# Patient Record
Sex: FEMALE | URBAN - METROPOLITAN AREA
[De-identification: names, ages, dates, MRNs, and addresses within clinical notes are randomized per-mention and may not be internally consistent; named-entity substitution may affect disease eponyms.]

---

## 2017-10-27 ENCOUNTER — OUTPATIENT (OUTPATIENT)
Dept: OUTPATIENT SERVICES | Facility: HOSPITAL | Age: 67
LOS: 1 days | End: 2017-10-27
Payer: MEDICARE

## 2017-10-27 PROCEDURE — 71020: CPT | Mod: 26

## 2017-11-03 ENCOUNTER — OUTPATIENT (OUTPATIENT)
Dept: OUTPATIENT SERVICES | Facility: HOSPITAL | Age: 67
LOS: 1 days | End: 2017-11-03

## 2022-08-31 PROBLEM — Z00.00 ENCOUNTER FOR PREVENTIVE HEALTH EXAMINATION: Status: ACTIVE | Noted: 2022-08-31

## 2022-09-01 ENCOUNTER — APPOINTMENT (OUTPATIENT)
Dept: UROLOGY | Facility: CLINIC | Age: 72
End: 2022-09-01

## 2022-09-01 VITALS
HEIGHT: 66 IN | HEART RATE: 137 BPM | WEIGHT: 107 LBS | OXYGEN SATURATION: 96 % | SYSTOLIC BLOOD PRESSURE: 113 MMHG | BODY MASS INDEX: 17.19 KG/M2 | DIASTOLIC BLOOD PRESSURE: 80 MMHG

## 2022-09-01 DIAGNOSIS — M54.9 DORSALGIA, UNSPECIFIED: ICD-10-CM

## 2022-09-01 PROCEDURE — 99204 OFFICE O/P NEW MOD 45 MIN: CPT

## 2022-09-01 RX ORDER — OXYCODONE AND ACETAMINOPHEN 5; 325 MG/1; MG/1
5-325 TABLET ORAL
Qty: 15 | Refills: 0 | Status: ACTIVE | COMMUNITY
Start: 2022-09-01 | End: 1900-01-01

## 2022-09-01 NOTE — REVIEW OF SYSTEMS
[Urine Infection (bladder/kidney)] : bladder/kidney infection [History of kidney stones] : history of kidney stones [Wake up at night to urinate  How many times?  ___] : wakes up to urinate [unfilled] times during the night [Leakage of urine with straining, coughing, laughing] : leakage of urine with straining, coughing, laughing [see HPI] : see HPI [Negative] : Heme/Lymph [FreeTextEntry2] : high blood pressure

## 2022-09-01 NOTE — PHYSICAL EXAM

## 2022-09-01 NOTE — HISTORY OF PRESENT ILLNESS
[FreeTextEntry1] : 71F presents today with a CC of a several month history of left flank and back pain. Pt states that she has a history of renal calculus formation in the past. She has had pain for the past several months and apparently AT-12 compression fractures have been noted. Her spine surgeon says that this is not the reason for her left flank and back pain. PMHx includes renal stone formation, GERD, high cholesterol, cardiovascular issues. PSHx includes subtotal thyroidectomy. Allergies are to penicillin. Medications includes Aspirin 81mg per day, Humira injection every two weeks, Crestor 5mg, Verapamil 100mg daily and Prilosec 20mg daily. Family history shows lung cancer and colon cancer in father. Pt uses tobacco one pack per day for many years. ETOH use is denied. Pt is a pharmacy technician.

## 2022-09-01 NOTE — END OF VISIT
[FreeTextEntry3] : I called in pain medications for her. I ordered a CT scan with and without contrast, a KUB x-ray, lumbosacral spine films, and thoracic spine films. Urine is sent for culture analysis and cytology. We will follow up regarding results.

## 2022-09-02 DIAGNOSIS — R80.9 PROTEINURIA, UNSPECIFIED: ICD-10-CM

## 2022-09-02 LAB
APPEARANCE: CLEAR
BACTERIA: NEGATIVE
BILIRUBIN URINE: ABNORMAL
BLOOD URINE: NEGATIVE
COLOR: YELLOW
GLUCOSE QUALITATIVE U: NORMAL
HYALINE CASTS: 0 /LPF
KETONES URINE: NORMAL
LEUKOCYTE ESTERASE URINE: NEGATIVE
MICROSCOPIC-UA: NORMAL
NITRITE URINE: NEGATIVE
PH URINE: 6
PROTEIN URINE: ABNORMAL
RED BLOOD CELLS URINE: 7 /HPF
SPECIFIC GRAVITY URINE: 1.03
SQUAMOUS EPITHELIAL CELLS: 7 /HPF
URINE COMMENTS: NORMAL
UROBILINOGEN URINE: ABNORMAL
WHITE BLOOD CELLS URINE: 4 /HPF

## 2022-09-03 PROBLEM — R80.9 ALBUMINURIA: Status: ACTIVE | Noted: 2022-09-03

## 2022-09-03 LAB — BACTERIA UR CULT: NORMAL

## 2022-09-12 ENCOUNTER — NON-APPOINTMENT (OUTPATIENT)
Age: 72
End: 2022-09-12

## 2022-09-12 DIAGNOSIS — N28.89 OTHER SPECIFIED DISORDERS OF KIDNEY AND URETER: ICD-10-CM

## 2022-09-12 DIAGNOSIS — R10.9 UNSPECIFIED ABDOMINAL PAIN: ICD-10-CM

## 2022-09-26 ENCOUNTER — OUTPATIENT (OUTPATIENT)
Dept: OUTPATIENT SERVICES | Facility: HOSPITAL | Age: 72
LOS: 1 days | End: 2022-09-26
Payer: MEDICARE

## 2022-09-26 DIAGNOSIS — N28.89 OTHER SPECIFIED DISORDERS OF KIDNEY AND URETER: ICD-10-CM

## 2022-09-26 PROCEDURE — 78708 K FLOW/FUNCT IMAGE W/DRUG: CPT | Mod: 26,MH

## 2022-09-26 PROCEDURE — A9562: CPT

## 2022-09-26 PROCEDURE — 78708 K FLOW/FUNCT IMAGE W/DRUG: CPT | Mod: MH

## 2022-09-26 RX ORDER — FUROSEMIDE 40 MG
40 TABLET ORAL ONCE
Refills: 0 | Status: COMPLETED | OUTPATIENT
Start: 2022-09-26 | End: 2022-09-26

## 2022-09-26 RX ORDER — SODIUM CHLORIDE 9 MG/ML
477 INJECTION INTRAMUSCULAR; INTRAVENOUS; SUBCUTANEOUS ONCE
Refills: 0 | Status: COMPLETED | OUTPATIENT
Start: 2022-09-26 | End: 2022-09-26

## 2022-09-26 RX ADMIN — Medication 40 MILLIGRAM(S): at 13:33

## 2022-09-26 RX ADMIN — SODIUM CHLORIDE 477 MILLILITER(S): 9 INJECTION INTRAMUSCULAR; INTRAVENOUS; SUBCUTANEOUS at 12:00

## 2022-09-27 DIAGNOSIS — N28.89 OTHER SPECIFIED DISORDERS OF KIDNEY AND URETER: ICD-10-CM

## 2022-09-30 ENCOUNTER — NON-APPOINTMENT (OUTPATIENT)
Age: 72
End: 2022-09-30

## 2022-11-29 RX ORDER — AZITHROMYCIN 500 MG/1
0 TABLET, FILM COATED ORAL
Qty: 0 | Refills: 0 | DISCHARGE
Start: 2022-11-29 | End: 2022-12-03

## 2022-12-01 ENCOUNTER — INPATIENT (INPATIENT)
Facility: HOSPITAL | Age: 72
LOS: 2 days | Discharge: HOME | DRG: 191 | End: 2022-12-04
Attending: HOSPITALIST | Admitting: INTERNAL MEDICINE
Payer: MEDICARE

## 2022-12-01 VITALS — WEIGHT: 102.07 LBS | HEIGHT: 66 IN

## 2022-12-01 DIAGNOSIS — J44.1 CHRONIC OBSTRUCTIVE PULMONARY DISEASE WITH (ACUTE) EXACERBATION: ICD-10-CM

## 2022-12-01 LAB
ALBUMIN SERPL ELPH-MCNC: 3.3 G/DL — SIGNIFICANT CHANGE UP (ref 3.3–5)
ALP SERPL-CCNC: 95 U/L — SIGNIFICANT CHANGE UP (ref 40–120)
ALT FLD-CCNC: 16 U/L — SIGNIFICANT CHANGE UP (ref 12–78)
ANION GAP SERPL CALC-SCNC: 10 MMOL/L — SIGNIFICANT CHANGE UP (ref 5–17)
APPEARANCE UR: CLEAR — SIGNIFICANT CHANGE UP
AST SERPL-CCNC: 11 U/L — LOW (ref 15–37)
BASOPHILS # BLD AUTO: 0.04 K/UL — SIGNIFICANT CHANGE UP (ref 0–0.2)
BASOPHILS NFR BLD AUTO: 0.2 % — SIGNIFICANT CHANGE UP (ref 0–2)
BILIRUB SERPL-MCNC: 0.3 MG/DL — SIGNIFICANT CHANGE UP (ref 0.2–1.2)
BILIRUB UR-MCNC: NEGATIVE — SIGNIFICANT CHANGE UP
BUN SERPL-MCNC: 13 MG/DL — SIGNIFICANT CHANGE UP (ref 7–23)
CALCIUM SERPL-MCNC: 10.3 MG/DL — HIGH (ref 8.5–10.1)
CHLORIDE SERPL-SCNC: 102 MMOL/L — SIGNIFICANT CHANGE UP (ref 96–108)
CO2 SERPL-SCNC: 25 MMOL/L — SIGNIFICANT CHANGE UP (ref 22–31)
COLOR SPEC: YELLOW — SIGNIFICANT CHANGE UP
CREAT SERPL-MCNC: 0.92 MG/DL — SIGNIFICANT CHANGE UP (ref 0.5–1.3)
DIFF PNL FLD: ABNORMAL
EGFR: 65 ML/MIN/1.73M2 — SIGNIFICANT CHANGE UP
EOSINOPHIL # BLD AUTO: 0 K/UL — SIGNIFICANT CHANGE UP (ref 0–0.5)
EOSINOPHIL NFR BLD AUTO: 0 % — SIGNIFICANT CHANGE UP (ref 0–6)
FLUAV AG NPH QL: SIGNIFICANT CHANGE UP
FLUBV AG NPH QL: SIGNIFICANT CHANGE UP
GLUCOSE SERPL-MCNC: 153 MG/DL — HIGH (ref 70–99)
GLUCOSE UR QL: NEGATIVE — SIGNIFICANT CHANGE UP
HCT VFR BLD CALC: 45.4 % — HIGH (ref 34.5–45)
HGB BLD-MCNC: 15.3 G/DL — SIGNIFICANT CHANGE UP (ref 11.5–15.5)
IMM GRANULOCYTES NFR BLD AUTO: 0.6 % — SIGNIFICANT CHANGE UP (ref 0–0.9)
KETONES UR-MCNC: NEGATIVE — SIGNIFICANT CHANGE UP
LEUKOCYTE ESTERASE UR-ACNC: NEGATIVE — SIGNIFICANT CHANGE UP
LYMPHOCYTES # BLD AUTO: 1.1 K/UL — SIGNIFICANT CHANGE UP (ref 1–3.3)
LYMPHOCYTES # BLD AUTO: 6.3 % — LOW (ref 13–44)
MAGNESIUM SERPL-MCNC: 1.8 MG/DL — SIGNIFICANT CHANGE UP (ref 1.6–2.6)
MCHC RBC-ENTMCNC: 29.3 PG — SIGNIFICANT CHANGE UP (ref 27–34)
MCHC RBC-ENTMCNC: 33.7 GM/DL — SIGNIFICANT CHANGE UP (ref 32–36)
MCV RBC AUTO: 86.8 FL — SIGNIFICANT CHANGE UP (ref 80–100)
MONOCYTES # BLD AUTO: 0.58 K/UL — SIGNIFICANT CHANGE UP (ref 0–0.9)
MONOCYTES NFR BLD AUTO: 3.3 % — SIGNIFICANT CHANGE UP (ref 2–14)
NEUTROPHILS # BLD AUTO: 15.73 K/UL — HIGH (ref 1.8–7.4)
NEUTROPHILS NFR BLD AUTO: 89.6 % — HIGH (ref 43–77)
NITRITE UR-MCNC: NEGATIVE — SIGNIFICANT CHANGE UP
NT-PROBNP SERPL-SCNC: 718 PG/ML — HIGH (ref 0–125)
PH UR: 7 — SIGNIFICANT CHANGE UP (ref 5–8)
PLATELET # BLD AUTO: 304 K/UL — SIGNIFICANT CHANGE UP (ref 150–400)
POTASSIUM SERPL-MCNC: 3.3 MMOL/L — LOW (ref 3.5–5.3)
POTASSIUM SERPL-SCNC: 3.3 MMOL/L — LOW (ref 3.5–5.3)
PROT SERPL-MCNC: 7.5 GM/DL — SIGNIFICANT CHANGE UP (ref 6–8.3)
PROT UR-MCNC: NEGATIVE — SIGNIFICANT CHANGE UP
RBC # BLD: 5.23 M/UL — HIGH (ref 3.8–5.2)
RBC # FLD: 13 % — SIGNIFICANT CHANGE UP (ref 10.3–14.5)
RSV RNA NPH QL NAA+NON-PROBE: SIGNIFICANT CHANGE UP
SARS-COV-2 RNA SPEC QL NAA+PROBE: SIGNIFICANT CHANGE UP
SODIUM SERPL-SCNC: 137 MMOL/L — SIGNIFICANT CHANGE UP (ref 135–145)
SP GR SPEC: 1 — LOW (ref 1.01–1.02)
TROPONIN I, HIGH SENSITIVITY RESULT: 6.9 NG/L — SIGNIFICANT CHANGE UP
UROBILINOGEN FLD QL: NEGATIVE — SIGNIFICANT CHANGE UP
WBC # BLD: 17.56 K/UL — HIGH (ref 3.8–10.5)
WBC # FLD AUTO: 17.56 K/UL — HIGH (ref 3.8–10.5)

## 2022-12-01 PROCEDURE — 93010 ELECTROCARDIOGRAM REPORT: CPT

## 2022-12-01 PROCEDURE — 81001 URINALYSIS AUTO W/SCOPE: CPT

## 2022-12-01 PROCEDURE — 84132 ASSAY OF SERUM POTASSIUM: CPT

## 2022-12-01 PROCEDURE — 86803 HEPATITIS C AB TEST: CPT

## 2022-12-01 PROCEDURE — 36415 COLL VENOUS BLD VENIPUNCTURE: CPT

## 2022-12-01 PROCEDURE — 83605 ASSAY OF LACTIC ACID: CPT

## 2022-12-01 PROCEDURE — 82962 GLUCOSE BLOOD TEST: CPT

## 2022-12-01 PROCEDURE — 80048 BASIC METABOLIC PNL TOTAL CA: CPT

## 2022-12-01 PROCEDURE — 83735 ASSAY OF MAGNESIUM: CPT

## 2022-12-01 PROCEDURE — 85025 COMPLETE CBC W/AUTO DIFF WBC: CPT

## 2022-12-01 PROCEDURE — 83036 HEMOGLOBIN GLYCOSYLATED A1C: CPT

## 2022-12-01 PROCEDURE — 99223 1ST HOSP IP/OBS HIGH 75: CPT

## 2022-12-01 PROCEDURE — 94667 MNPJ CHEST WALL 1ST: CPT

## 2022-12-01 PROCEDURE — 99291 CRITICAL CARE FIRST HOUR: CPT

## 2022-12-01 PROCEDURE — 94640 AIRWAY INHALATION TREATMENT: CPT

## 2022-12-01 PROCEDURE — 84145 PROCALCITONIN (PCT): CPT

## 2022-12-01 PROCEDURE — 80053 COMPREHEN METABOLIC PANEL: CPT

## 2022-12-01 PROCEDURE — 71045 X-RAY EXAM CHEST 1 VIEW: CPT | Mod: 26

## 2022-12-01 RX ORDER — ATORVASTATIN CALCIUM 80 MG/1
20 TABLET, FILM COATED ORAL AT BEDTIME
Refills: 0 | Status: DISCONTINUED | OUTPATIENT
Start: 2022-12-01 | End: 2022-12-04

## 2022-12-01 RX ORDER — GLUCAGON INJECTION, SOLUTION 0.5 MG/.1ML
1 INJECTION, SOLUTION SUBCUTANEOUS ONCE
Refills: 0 | Status: DISCONTINUED | OUTPATIENT
Start: 2022-12-01 | End: 2022-12-04

## 2022-12-01 RX ORDER — ROSUVASTATIN CALCIUM 5 MG/1
1 TABLET ORAL
Qty: 0 | Refills: 0 | DISCHARGE

## 2022-12-01 RX ORDER — POTASSIUM CHLORIDE 20 MEQ
40 PACKET (EA) ORAL ONCE
Refills: 0 | Status: COMPLETED | OUTPATIENT
Start: 2022-12-01 | End: 2022-12-02

## 2022-12-01 RX ORDER — DEXTROSE 50 % IN WATER 50 %
25 SYRINGE (ML) INTRAVENOUS ONCE
Refills: 0 | Status: DISCONTINUED | OUTPATIENT
Start: 2022-12-01 | End: 2022-12-04

## 2022-12-01 RX ORDER — NICOTINE POLACRILEX 2 MG
1 GUM BUCCAL DAILY
Refills: 0 | Status: DISCONTINUED | OUTPATIENT
Start: 2022-12-01 | End: 2022-12-04

## 2022-12-01 RX ORDER — IPRATROPIUM/ALBUTEROL SULFATE 18-103MCG
3 AEROSOL WITH ADAPTER (GRAM) INHALATION ONCE
Refills: 0 | Status: COMPLETED | OUTPATIENT
Start: 2022-12-01 | End: 2022-12-01

## 2022-12-01 RX ORDER — LANOLIN ALCOHOL/MO/W.PET/CERES
3 CREAM (GRAM) TOPICAL AT BEDTIME
Refills: 0 | Status: DISCONTINUED | OUTPATIENT
Start: 2022-12-01 | End: 2022-12-04

## 2022-12-01 RX ORDER — SODIUM CHLORIDE 9 MG/ML
2000 INJECTION INTRAMUSCULAR; INTRAVENOUS; SUBCUTANEOUS ONCE
Refills: 0 | Status: COMPLETED | OUTPATIENT
Start: 2022-12-01 | End: 2022-12-01

## 2022-12-01 RX ORDER — INSULIN LISPRO 100/ML
VIAL (ML) SUBCUTANEOUS AT BEDTIME
Refills: 0 | Status: DISCONTINUED | OUTPATIENT
Start: 2022-12-01 | End: 2022-12-04

## 2022-12-01 RX ORDER — VERAPAMIL HCL 240 MG
40 CAPSULE, EXTENDED RELEASE PELLETS 24 HR ORAL THREE TIMES A DAY
Refills: 0 | Status: DISCONTINUED | OUTPATIENT
Start: 2022-12-01 | End: 2022-12-02

## 2022-12-01 RX ORDER — PANTOPRAZOLE SODIUM 20 MG/1
40 TABLET, DELAYED RELEASE ORAL
Refills: 0 | Status: DISCONTINUED | OUTPATIENT
Start: 2022-12-01 | End: 2022-12-04

## 2022-12-01 RX ORDER — AZITHROMYCIN 500 MG/1
500 TABLET, FILM COATED ORAL EVERY 24 HOURS
Refills: 0 | Status: DISCONTINUED | OUTPATIENT
Start: 2022-12-01 | End: 2022-12-04

## 2022-12-01 RX ORDER — VERAPAMIL HCL 240 MG
120 CAPSULE, EXTENDED RELEASE PELLETS 24 HR ORAL AT BEDTIME
Refills: 0 | Status: DISCONTINUED | OUTPATIENT
Start: 2022-12-01 | End: 2022-12-01

## 2022-12-01 RX ORDER — ACETAMINOPHEN 500 MG
650 TABLET ORAL EVERY 6 HOURS
Refills: 0 | Status: DISCONTINUED | OUTPATIENT
Start: 2022-12-01 | End: 2022-12-04

## 2022-12-01 RX ORDER — DEXTROSE 50 % IN WATER 50 %
15 SYRINGE (ML) INTRAVENOUS ONCE
Refills: 0 | Status: DISCONTINUED | OUTPATIENT
Start: 2022-12-01 | End: 2022-12-04

## 2022-12-01 RX ORDER — MAGNESIUM SULFATE 500 MG/ML
2 VIAL (ML) INJECTION ONCE
Refills: 0 | Status: COMPLETED | OUTPATIENT
Start: 2022-12-01 | End: 2022-12-01

## 2022-12-01 RX ORDER — ONDANSETRON 8 MG/1
4 TABLET, FILM COATED ORAL EVERY 8 HOURS
Refills: 0 | Status: DISCONTINUED | OUTPATIENT
Start: 2022-12-01 | End: 2022-12-04

## 2022-12-01 RX ORDER — BUDESONIDE AND FORMOTEROL FUMARATE DIHYDRATE 160; 4.5 UG/1; UG/1
2 AEROSOL RESPIRATORY (INHALATION)
Refills: 0 | Status: DISCONTINUED | OUTPATIENT
Start: 2022-12-01 | End: 2022-12-04

## 2022-12-01 RX ORDER — SODIUM CHLORIDE 9 MG/ML
1000 INJECTION, SOLUTION INTRAVENOUS
Refills: 0 | Status: DISCONTINUED | OUTPATIENT
Start: 2022-12-01 | End: 2022-12-04

## 2022-12-01 RX ORDER — ALBUTEROL 90 UG/1
2 AEROSOL, METERED ORAL EVERY 6 HOURS
Refills: 0 | Status: DISCONTINUED | OUTPATIENT
Start: 2022-12-01 | End: 2022-12-04

## 2022-12-01 RX ORDER — TIOTROPIUM BROMIDE 18 UG/1
2 CAPSULE ORAL; RESPIRATORY (INHALATION) DAILY
Refills: 0 | Status: DISCONTINUED | OUTPATIENT
Start: 2022-12-01 | End: 2022-12-04

## 2022-12-01 RX ORDER — INSULIN LISPRO 100/ML
VIAL (ML) SUBCUTANEOUS
Refills: 0 | Status: DISCONTINUED | OUTPATIENT
Start: 2022-12-01 | End: 2022-12-04

## 2022-12-01 RX ORDER — ASPIRIN/CALCIUM CARB/MAGNESIUM 324 MG
81 TABLET ORAL DAILY
Refills: 0 | Status: DISCONTINUED | OUTPATIENT
Start: 2022-12-01 | End: 2022-12-04

## 2022-12-01 RX ORDER — DEXTROSE 50 % IN WATER 50 %
12.5 SYRINGE (ML) INTRAVENOUS ONCE
Refills: 0 | Status: DISCONTINUED | OUTPATIENT
Start: 2022-12-01 | End: 2022-12-04

## 2022-12-01 RX ORDER — SODIUM CHLORIDE 9 MG/ML
1000 INJECTION, SOLUTION INTRAVENOUS
Refills: 0 | Status: DISCONTINUED | OUTPATIENT
Start: 2022-12-01 | End: 2022-12-03

## 2022-12-01 RX ORDER — CEFTRIAXONE 500 MG/1
1000 INJECTION, POWDER, FOR SOLUTION INTRAMUSCULAR; INTRAVENOUS EVERY 24 HOURS
Refills: 0 | Status: DISCONTINUED | OUTPATIENT
Start: 2022-12-01 | End: 2022-12-04

## 2022-12-01 RX ADMIN — Medication 150 GRAM(S): at 16:13

## 2022-12-01 RX ADMIN — Medication 2 GRAM(S): at 16:40

## 2022-12-01 RX ADMIN — SODIUM CHLORIDE 2000 MILLILITER(S): 9 INJECTION INTRAMUSCULAR; INTRAVENOUS; SUBCUTANEOUS at 16:59

## 2022-12-01 RX ADMIN — Medication 3 MILLILITER(S): at 16:13

## 2022-12-01 NOTE — H&P ADULT - HISTORY OF PRESENT ILLNESS
Patient denies fever, chills, headache, dizziness, change in vision, blurry vision, anosmia, ageusia, chest pain, palpitations, shortness of breath, abdominal pain, nausea, vomiting, diarrhea, constipation, hematochezia, melena, change in bowel movement, weight loss, dysuria, urinary frequency, urgency, hematuria, numbness, weakness or tingling  71 y/o female with PMHx of HTN, COPD (not on home oxygen), Crohn's disease on Humira presents to ED complaining of SOB. Patient reports of shortness of breath, started about 1 week ago and been progressively worsening. Reports of non-productive cough. States she was evaluated at [facility] yesterday, had CT chest done: negative for PNA and was started on  steroids. She reports leaving AMA. Reports of worsening SOB, went to see Dr. Romana and was sent to ED for further evaluation. Reports she was started on Z-kervin and Prednisone which she took today. Reports taking Albuterol today. Patient denies fever, chills, headache, dizziness, change in vision, blurry vision, chest pain, palpitations, , abdominal pain, nausea, vomiting, diarrhea constipation,     In ED, initial vitals was stable with RR: 18, SpO2: 100% on RA. WBC: 15k, lactate 3.5. Patient received IV fluids, IV Mag and 1x dose of Levaquin

## 2022-12-01 NOTE — ED PROVIDER NOTE - CLINICAL SUMMARY MEDICAL DECISION MAKING FREE TEXT BOX
Pt with history of COPD still presently smoking, Crohn's disease, here with SOB x 4 days. Pt seen at [place] yesterday and signed out due to not wanting to have oxygen. Pt presently in mild respiratory distress, saturating ok, wanting to see her doctor. Will give magnesium, do labs, and EKG.

## 2022-12-01 NOTE — H&P ADULT - NSHPPHYSICALEXAM_GEN_ALL_CORE
Vitals  T(F): 98 (12-01-22 @ 21:41), Max: 98.1 (12-01-22 @ 15:08)  HR: 111 (12-01-22 @ 21:41) (99 - 111)  BP: 135/54 (12-01-22 @ 21:41) (118/51 - 135/54)  RR: 20 (12-01-22 @ 21:41) (18 - 22)  SpO2: 93% (12-01-22 @ 21:41) (93% - 100%)    PHYSICAL EXAM   Gen: NAD, comfortable, AA&Ox4  HEENT: head atrumatic and normocephalic, PEERLA, EOMI,  no gross abnormalities of ears, mucous membranes moist, no oral lesions, neck supple without masses/goiter/lymphadenopathy, no JVD  CVS: +s1, s2, regular rate and rhythm, no murmurs, rubs or gallops, no thrill, normal PMI  Pulmonary: normal respiratory effort, clear to auscultation b/l, no wheezes/crackles/rhonchi  Abdomen: soft, non-tender, non-distended, +bowel sounds in all 4 quadrants, no masses noted, no guarding or rigidity   Back: no scoliosis, lordosis, or kyphosis, no point tenderness, no CVA tenderness   Extremities: no pedal edema, pedal pulses palpable, <2 sec. cap refill   Skin: nl warm and dry, no wounds   Neuro: answering questions appropriately, face symmetric, sensation equal bilaterally in face, tongue midline, no dysarthria, 5/5 strength in upper and lower extremities bilaterally, sensation intact in upper and lower extremities bilaterally, nl finger to nose, and nl heel to shin Vitals  T(F): 98 (12-01-22 @ 21:41), Max: 98.1 (12-01-22 @ 15:08)  HR: 111 (12-01-22 @ 21:41) (99 - 111)  BP: 135/54 (12-01-22 @ 21:41) (118/51 - 135/54)  RR: 20 (12-01-22 @ 21:41) (18 - 22)  SpO2: 93% (12-01-22 @ 21:41) (93% - 100%)    PHYSICAL EXAM   Gen: NAD, comfortable, AA&Ox4, (+)frail  HEENT: head atrumatic and normocephalic, PEERLA, EOMI,  no gross abnormalities of ears, mucous membranes moist, no oral lesions, neck supple without masses/goiter/lymphadenopathy, no JVD  CVS: +s1, s2, regular rate and rhythm, no murmurs, rubs or gallops, no thrill, normal PMI  Pulmonary: (+)diffuse wheezing   Abdomen: soft, non-tender, non-distended, +bowel sounds in all 4 quadrants, no masses noted, no guarding or rigidity   Back: no scoliosis, lordosis, or kyphosis, no point tenderness, no CVA tenderness   Extremities: no pedal edema, pedal pulses palpable, <2 sec. cap refill   Skin: nl warm and dry, no wounds   Neuro: grossly non-focal

## 2022-12-01 NOTE — ED PROVIDER NOTE - CRITICAL CARE ATTENDING CONTRIBUTION TO CARE
Critical care time spent evaluating and reeevaluating patient, ordering and interpreting diagnostics, ordering therapeutics, speaking to admitting MD and documenting. Dannie YATES

## 2022-12-01 NOTE — H&P ADULT - ASSESSMENT
ADMIT: Med/Surg    #Acute Hypoxic Respiratory Failure likely secondary to Acute COPD Exacerbation   -RVP ?  -CXR/CT chest: as above   -Pulse ox q8 hrs  -Nebs q 6 hrs PRN  -Solumedrol 125mg IV then 40 q 6 with plan for taper  -Supplemental Oxygen PRN, titrate PRN for targer SpO2: 92%-96%  -Start Azithromycin + Rocephin emperically   -on home oxygen ?  -Pulmonary consult          ADMIT: Med/Surg    #Acute Hypoxic Respiratory Failure likely secondary to Acute COPD Exacerbation   -RVP: negative   -CXR as above   -Pulse ox q8 hrs  -Nebs q 6 hrs PRN  -Solumedrol 125mg IV then 40 q 6 with plan for taper  -Supplemental Oxygen PRN, titrate PRN for target SpO2: 92%-96%  -Start Azithromycin + Rocephin empirically   -f/u procal in AM   -not home oxygen   -Pulmonary consult     #Leukocytosis   -likely secondary to Prednisone   -continue to monitor     #Lactic Acidosis   -likely due to severe COPD   -no clinical evidence of sepsis   -s/p IV fluids   -f/u cultures     #Hyperglycemia   -A1c in AM   -ISS     #Hypercalcemia   -repeat in AM     #Hypokalemia   -replete and trend       #Hx of Crohn's/HLD/HTN  -c/w home meds. Verapamil 100mg switched to 40mg TID (pharmacy does not carry pt home's med), c/w other medications     #DVT prophylaxis  -ambulate     #Advanced Directives   -FULL CODE          ADMIT: Med/Surg    #Acute COPD Exacerbation   -RVP: negative   -CXR as above   -Pulse ox q8 hrs  -Nebs q 6 hrs PRN  -Solumedrol 125mg IV then 40 q 6 with plan for taper  -Supplemental Oxygen PRN, titrate PRN for target SpO2: 92%-96%  -Start Azithromycin + Rocephin empirically   -f/u procal in AM   -not home oxygen   -Pulmonary consult     #Leukocytosis   -likely secondary to Prednisone   -continue to monitor     #Lactic Acidosis   -likely due to severe COPD   -no clinical evidence of sepsis   -s/p IV fluids   -f/u cultures     #Hyperglycemia   -A1c in AM   -ISS     #Hypercalcemia   -repeat in AM     #Hypokalemia   -replete and trend       #Hx of Crohn's/HLD/HTN  -c/w home meds. Verapamil 100mg switched to 40mg TID (pharmacy does not carry pt home's med), c/w other medications     #DVT prophylaxis  -ambulate     #Advanced Directives   -FULL CODE          ADMIT: Med/Surg    #Acute COPD Exacerbation   -RVP: negative   -CXR as above   -Pulse ox q8 hrs  -Nebs q 6 hrs PRN  -Solumedrol 125mg IV then 40 q 6 with plan for taper  -Start Azithromycin + Rocephin empirically   -Start Spiriva + Symbicort   -Supplemental Oxygen PRN, titrate PRN for target SpO2: 92%-96%  -f/u procal in AM   -not home oxygen   -Pulmonary consult     #Leukocytosis   -likely secondary to Prednisone   -continue to monitor     #Lactic Acidosis   -likely due to severe COPD   -no clinical evidence of sepsis   -s/p IV fluids   -f/u cultures     #Hyperglycemia   -A1c in AM   -ISS     #Hypercalcemia   -repeat in AM     #Hypokalemia   -replete and trend       #Hx of Crohn's/HLD/HTN  -c/w home meds. Verapamil 100mg switched to 40mg TID (pharmacy does not carry pt home's med), c/w other medications     #DVT prophylaxis  -ambulate     #Advanced Directives   -FULL CODE          ADMIT: Med/Surg    #Acute COPD Exacerbation   -RVP: negative   -CXR as above   -Pulse ox q8 hrs  -Nebs q 6 hrs PRN  -Solumedrol 125mg IV then 40 q 6 with plan for taper  -Start Azithromycin + Rocephin empirically   -Start Spiriva + Symbicort   -Supplemental Oxygen PRN, titrate PRN for target SpO2: 92%-96%  -f/u procal in AM   -not home oxygen   -Pulmonary consult     #Leukocytosis   -likely secondary to Prednisone   -continue to monitor     #Lactic Acidosis   -likely due to severe COPD   -no clinical evidence of sepsis   -s/p IV fluids   -f/u cultures   -on abx     #Hyperglycemia   -A1c in AM   -ISS     #Hypercalcemia   -repeat in AM     #Hypokalemia   -replete and trend       #Hx of Crohn's/HLD/HTN  -c/w home meds. Verapamil 100mg switched to 40mg TID (pharmacy does not carry pt home's med), c/w other medications     #DVT prophylaxis  -ambulate     #Advanced Directives   -FULL CODE

## 2022-12-01 NOTE — ED PROVIDER NOTE - OBJECTIVE STATEMENT
73 y/o female with PMHx of HTN, COPD, Crohn's disease presents to the ED c/o SOB and palpitations x 1 week. Symptoms began with a dry cough.  and SpO2 92% in triage. Pt talking in complete sentences, no distress noted. Pt told to come to the ED for further evaluation by Dr. Fulton. Pt was seen in [place] ED, was given nebulizers and signed out AMA. Denies cardiac history. Pt does not use O2 at home. Pt used albuterol at 1:30P.M today. Pt was prescribed Prednisone yesterday and started taking it today with 1 dose.  GI Doctor: Dr. Kiran

## 2022-12-01 NOTE — ED ADULT TRIAGE NOTE - CHIEF COMPLAINT QUOTE
Pt resting quietly on stretcher; remains calm and cooperative. VS obtained; stable. Sitter remains at bedside in direct visual contact, charting per protocol every 15 minutes. No equipment or belongings are in the patients room.  Pt denies needs or complaints at this time.  Bed locked in lowest position; side rails up and locked x 2.  Pt instructed to alert sitter or nurse for assistance and before attempting to get out of bed; verbalizes understanding.  Will continue to monitor pt.       Pt presents to the ED c/o SOB and palpitations.  and SpO2 92% in triage. Pt talking in complete sentences, no distress noted. Pt told to come to the ED for further evaluation by cardiologist Dr. Fulton. Pt was seen in [place] ED and signed out AMA. Denies cardiac history. Pt sent for EKG.

## 2022-12-01 NOTE — ED ADULT NURSE NOTE - CHIEF COMPLAINT QUOTE
Pt presents to the ED c/o SOB and palpitations.  and SpO2 92% in triage. Pt talking in complete sentences, no distress noted. Pt told to come to the ED for further evaluation by cardiologist Dr. Fulton. Pt was seen in [place] ED and signed out AMA. Denies cardiac history. Pt sent for EKG.

## 2022-12-01 NOTE — ED PROVIDER NOTE - PRO INTERPRETER NEED 2
PATIENTS ONCOLOGY RECORD LOCATED IN [facility]      Subjective     Name:  IGNACIO PHAM     Date:  2018  Address:   [place] [address]  Home: [phone]  :  1956 AGE:  62 y.o.        RECORDS OBTAINED:  Patients Oncology Record is located in [facility]   English

## 2022-12-01 NOTE — ED ADULT NURSE NOTE - NSICDXPASTMEDICALHX_GEN_ALL_CORE_FT
PAST MEDICAL HISTORY:  Crohn disease     High cholesterol     HTN (hypertension)       History of COPD

## 2022-12-01 NOTE — ED ADULT NURSE NOTE - OBJECTIVE STATEMENT
pt A&ox4, c/o sob, cough and fever x1 week pt went to [place] yesterday and AMA'd, pt reports improvement in breathing since going to [place] but still having SOB, hx COPD and current smoker, resp even and unlabored no distress noted

## 2022-12-02 LAB
A1C WITH ESTIMATED AVERAGE GLUCOSE RESULT: 5.7 % — HIGH (ref 4–5.6)
ALBUMIN SERPL ELPH-MCNC: 2.6 G/DL — LOW (ref 3.3–5)
ALP SERPL-CCNC: 75 U/L — SIGNIFICANT CHANGE UP (ref 40–120)
ALT FLD-CCNC: 24 U/L — SIGNIFICANT CHANGE UP (ref 12–78)
ANION GAP SERPL CALC-SCNC: 7 MMOL/L — SIGNIFICANT CHANGE UP (ref 5–17)
AST SERPL-CCNC: 23 U/L — SIGNIFICANT CHANGE UP (ref 15–37)
BASOPHILS # BLD AUTO: 0.03 K/UL — SIGNIFICANT CHANGE UP (ref 0–0.2)
BASOPHILS NFR BLD AUTO: 0.2 % — SIGNIFICANT CHANGE UP (ref 0–2)
BILIRUB SERPL-MCNC: 0.2 MG/DL — SIGNIFICANT CHANGE UP (ref 0.2–1.2)
BUN SERPL-MCNC: 11 MG/DL — SIGNIFICANT CHANGE UP (ref 7–23)
CALCIUM SERPL-MCNC: 9.2 MG/DL — SIGNIFICANT CHANGE UP (ref 8.5–10.1)
CHLORIDE SERPL-SCNC: 108 MMOL/L — SIGNIFICANT CHANGE UP (ref 96–108)
CO2 SERPL-SCNC: 25 MMOL/L — SIGNIFICANT CHANGE UP (ref 22–31)
CREAT SERPL-MCNC: 0.7 MG/DL — SIGNIFICANT CHANGE UP (ref 0.5–1.3)
EGFR: 91 ML/MIN/1.73M2 — SIGNIFICANT CHANGE UP
EOSINOPHIL # BLD AUTO: 0 K/UL — SIGNIFICANT CHANGE UP (ref 0–0.5)
EOSINOPHIL NFR BLD AUTO: 0 % — SIGNIFICANT CHANGE UP (ref 0–6)
ESTIMATED AVERAGE GLUCOSE: 117 MG/DL — HIGH (ref 68–114)
GLUCOSE SERPL-MCNC: 138 MG/DL — HIGH (ref 70–99)
HCT VFR BLD CALC: 38.8 % — SIGNIFICANT CHANGE UP (ref 34.5–45)
HCV AB S/CO SERPL IA: 0.19 S/CO — SIGNIFICANT CHANGE UP (ref 0–0.99)
HCV AB SERPL-IMP: SIGNIFICANT CHANGE UP
HGB BLD-MCNC: 12.8 G/DL — SIGNIFICANT CHANGE UP (ref 11.5–15.5)
IMM GRANULOCYTES NFR BLD AUTO: 1 % — HIGH (ref 0–0.9)
LYMPHOCYTES # BLD AUTO: 1.13 K/UL — SIGNIFICANT CHANGE UP (ref 1–3.3)
LYMPHOCYTES # BLD AUTO: 6.1 % — LOW (ref 13–44)
MCHC RBC-ENTMCNC: 28.9 PG — SIGNIFICANT CHANGE UP (ref 27–34)
MCHC RBC-ENTMCNC: 33 GM/DL — SIGNIFICANT CHANGE UP (ref 32–36)
MCV RBC AUTO: 87.6 FL — SIGNIFICANT CHANGE UP (ref 80–100)
MONOCYTES # BLD AUTO: 0.51 K/UL — SIGNIFICANT CHANGE UP (ref 0–0.9)
MONOCYTES NFR BLD AUTO: 2.7 % — SIGNIFICANT CHANGE UP (ref 2–14)
NEUTROPHILS # BLD AUTO: 16.73 K/UL — HIGH (ref 1.8–7.4)
NEUTROPHILS NFR BLD AUTO: 90 % — HIGH (ref 43–77)
PLATELET # BLD AUTO: 292 K/UL — SIGNIFICANT CHANGE UP (ref 150–400)
POTASSIUM SERPL-MCNC: 3.6 MMOL/L — SIGNIFICANT CHANGE UP (ref 3.5–5.3)
POTASSIUM SERPL-SCNC: 3.6 MMOL/L — SIGNIFICANT CHANGE UP (ref 3.5–5.3)
PROCALCITONIN SERPL-MCNC: 0.05 NG/ML — SIGNIFICANT CHANGE UP (ref 0.02–0.1)
PROT SERPL-MCNC: 6 GM/DL — SIGNIFICANT CHANGE UP (ref 6–8.3)
RBC # BLD: 4.43 M/UL — SIGNIFICANT CHANGE UP (ref 3.8–5.2)
RBC # FLD: 13.2 % — SIGNIFICANT CHANGE UP (ref 10.3–14.5)
SODIUM SERPL-SCNC: 140 MMOL/L — SIGNIFICANT CHANGE UP (ref 135–145)
WBC # BLD: 18.58 K/UL — HIGH (ref 3.8–10.5)
WBC # FLD AUTO: 18.58 K/UL — HIGH (ref 3.8–10.5)

## 2022-12-02 PROCEDURE — 99233 SBSQ HOSP IP/OBS HIGH 50: CPT | Mod: GC

## 2022-12-02 RX ORDER — VERAPAMIL HCL 240 MG
40 CAPSULE, EXTENDED RELEASE PELLETS 24 HR ORAL THREE TIMES A DAY
Refills: 0 | Status: DISCONTINUED | OUTPATIENT
Start: 2022-12-02 | End: 2022-12-04

## 2022-12-02 RX ORDER — ALBUTEROL 90 UG/1
2.5 AEROSOL, METERED ORAL EVERY 6 HOURS
Refills: 0 | Status: DISCONTINUED | OUTPATIENT
Start: 2022-12-02 | End: 2022-12-04

## 2022-12-02 RX ORDER — INFLUENZA VIRUS VACCINE 15; 15; 15; 15 UG/.5ML; UG/.5ML; UG/.5ML; UG/.5ML
0.7 SUSPENSION INTRAMUSCULAR ONCE
Refills: 0 | Status: DISCONTINUED | OUTPATIENT
Start: 2022-12-02 | End: 2022-12-04

## 2022-12-02 RX ADMIN — CEFTRIAXONE 1000 MILLIGRAM(S): 500 INJECTION, POWDER, FOR SOLUTION INTRAMUSCULAR; INTRAVENOUS at 00:00

## 2022-12-02 RX ADMIN — Medication 81 MILLIGRAM(S): at 09:14

## 2022-12-02 RX ADMIN — PANTOPRAZOLE SODIUM 40 MILLIGRAM(S): 20 TABLET, DELAYED RELEASE ORAL at 06:23

## 2022-12-02 RX ADMIN — ATORVASTATIN CALCIUM 20 MILLIGRAM(S): 80 TABLET, FILM COATED ORAL at 21:31

## 2022-12-02 RX ADMIN — Medication 60 MILLIGRAM(S): at 12:21

## 2022-12-02 RX ADMIN — AZITHROMYCIN 255 MILLIGRAM(S): 500 TABLET, FILM COATED ORAL at 00:06

## 2022-12-02 RX ADMIN — ALBUTEROL 2.5 MILLIGRAM(S): 90 AEROSOL, METERED ORAL at 17:25

## 2022-12-02 RX ADMIN — ATORVASTATIN CALCIUM 20 MILLIGRAM(S): 80 TABLET, FILM COATED ORAL at 00:00

## 2022-12-02 RX ADMIN — BUDESONIDE AND FORMOTEROL FUMARATE DIHYDRATE 2 PUFF(S): 160; 4.5 AEROSOL RESPIRATORY (INHALATION) at 20:40

## 2022-12-02 RX ADMIN — ALBUTEROL 2.5 MILLIGRAM(S): 90 AEROSOL, METERED ORAL at 20:39

## 2022-12-02 RX ADMIN — Medication 650 MILLIGRAM(S): at 07:35

## 2022-12-02 RX ADMIN — Medication 40 MILLIGRAM(S): at 00:09

## 2022-12-02 RX ADMIN — SODIUM CHLORIDE 75 MILLILITER(S): 9 INJECTION, SOLUTION INTRAVENOUS at 00:06

## 2022-12-02 RX ADMIN — TIOTROPIUM BROMIDE 2 PUFF(S): 18 CAPSULE ORAL; RESPIRATORY (INHALATION) at 08:44

## 2022-12-02 RX ADMIN — SODIUM CHLORIDE 75 MILLILITER(S): 9 INJECTION, SOLUTION INTRAVENOUS at 12:26

## 2022-12-02 RX ADMIN — Medication 40 MILLIEQUIVALENT(S): at 09:14

## 2022-12-02 RX ADMIN — Medication 40 MILLIGRAM(S): at 06:24

## 2022-12-02 RX ADMIN — Medication 3 MILLIGRAM(S): at 00:00

## 2022-12-02 RX ADMIN — BUDESONIDE AND FORMOTEROL FUMARATE DIHYDRATE 2 PUFF(S): 160; 4.5 AEROSOL RESPIRATORY (INHALATION) at 08:30

## 2022-12-02 NOTE — CONSULT NOTE ADULT - ASSESSMENT
PROBLEMS:    Acute COPD Exacerbation   Hyperglycemia  Hypercalcemia  Hx of Crohn's / HLD / HTN    PLAN;    RVP: negative   Nebs q 6 hrs ATC & PRN  IV Solumedrol 60 q 12HR  Azithromycin + Rocephin ABX   Spiriva + Symbicort   Supplemental Oxygen PRN-titrate PRN for target SpO2: 92%-96%  DVT prophylaxis-ambulate   
patient with COPD exacerbation, most likely from smoking and some URI; elevated wBC with elevated lactate level-  pulm-treat COPD and any underlying infection  cardio-no signs of ischemia or CHF at this time  will f/u prn as needed

## 2022-12-02 NOTE — CONSULT NOTE ADULT - SUBJECTIVE AND OBJECTIVE BOX
HPI:    72 y.o.m with PMHx of HTN, COPD (not on home oxygen), Crohn's disease on Humira admitted complaining of SOB. Patient reports of shortness of breath, started about 1 week ago and been progressively worsening. non-productive cough. She was evaluated at [facility] yesterday, had CT chest done: negative for PNA and was started on steroids. She reports leaving AMA. Reports of worsening SOB, went to see Dr. Romana and was sent to ED for further evaluation. Reports she was started on Z-kervin and Prednisone which she took today. Reports taking Albuterol today. Patient denies fever, chills, headache, dizziness, change in vision, blurry vision, chest pain, palpitations, , abdominal pain, nausea, vomiting, diarrhea constipation, In ED, initial vitals was stable with RR: 18, SpO2: 100% on RA. WBC: 15k, lactate 3.5. Patient received IV fluids, IV Mag and 1x dose of Levaquin. pat seen for pulmonary eval.      PAST MEDICAL & SURGICAL HISTORY:  Crohn disease      HTN (hypertension)      High cholesterol          Home Medications:  Albuterol (Eqv-ProAir HFA) 90 mcg/inh inhalation aerosol: 2 puff(s) inhaled every 6 hours, As Needed (01 Dec 2022 19:31)  Aspir 81 oral delayed release tablet: 1 tab(s) orally once a day (01 Dec 2022 19:31)  azithromycin 250 mg oral tablet: ***take 2 tablets by mouth today, then take 1 tablet daily for 4 days*** (01 Dec 2022 19:)  Humira 40 mg/0.8 mL subcutaneous kit: subcutaneous every 2 weeks (01 Dec 2022 19:31)  predniSONE 50 mg oral tablet: 1 tab(s) orally once a day (01 Dec 2022 19:31)  PriLOSEC 20 mg oral delayed release capsule: 1 cap(s) orally once a day (01 Dec 2022 19:31)  rosuvastatin 5 mg oral tablet: 1 tab(s) orally once a day (at bedtime) (01 Dec 2022 19:)  verapamil 100 mg/24 hours oral capsule, extended release: 1 cap(s) orally once a day (at bedtime) (01 Dec 2022 19:31)  Vitamin D3 50 mcg (2000 intl units) oral capsule: 1 cap(s) orally once a day (01 Dec 2022 19:31)      MEDICATIONS  (STANDING):  aspirin enteric coated 81 milliGRAM(s) Oral daily  atorvastatin 20 milliGRAM(s) Oral at bedtime  azithromycin  IVPB 500 milliGRAM(s) IV Intermittent every 24 hours  budesonide  80 MICROgram(s)/formoterol 4.5 MICROgram(s) Inhaler 2 Puff(s) Inhalation two times a day  cefTRIAXone Injectable. 1000 milliGRAM(s) IV Push every 24 hours  dextrose 5%. 1000 milliLiter(s) (100 mL/Hr) IV Continuous <Continuous>  dextrose 5%. 1000 milliLiter(s) (50 mL/Hr) IV Continuous <Continuous>  dextrose 50% Injectable 25 Gram(s) IV Push once  dextrose 50% Injectable 12.5 Gram(s) IV Push once  dextrose 50% Injectable 25 Gram(s) IV Push once  glucagon  Injectable 1 milliGRAM(s) IntraMuscular once  influenza  Vaccine (HIGH DOSE) 0.7 milliLiter(s) IntraMuscular once  insulin lispro (ADMELOG) corrective regimen sliding scale   SubCutaneous three times a day before meals  insulin lispro (ADMELOG) corrective regimen sliding scale   SubCutaneous at bedtime  lactated ringers. 1000 milliLiter(s) (75 mL/Hr) IV Continuous <Continuous>  methylPREDNISolone sodium succinate Injectable 60 milliGRAM(s) IV Push two times a day  nicotine - 21 mG/24Hr(s) Patch 1 Patch Transdermal daily  pantoprazole    Tablet 40 milliGRAM(s) Oral before breakfast  tiotropium 2.5 MICROgram(s) Inhaler 2 Puff(s) Inhalation daily  verapamil 40 milliGRAM(s) Oral three times a day    MEDICATIONS  (PRN):  acetaminophen     Tablet .. 650 milliGRAM(s) Oral every 6 hours PRN Temp greater or equal to 38C (100.4F), Mild Pain (1 - 3)  albuterol    90 MICROgram(s) HFA Inhaler 2 Puff(s) Inhalation every 6 hours PRN Bronchospasm  aluminum hydroxide/magnesium hydroxide/simethicone Suspension 30 milliLiter(s) Oral every 4 hours PRN Dyspepsia  dextrose Oral Gel 15 Gram(s) Oral once PRN Blood Glucose LESS THAN 70 milliGRAM(s)/deciliter  melatonin 3 milliGRAM(s) Oral at bedtime PRN Insomnia  ondansetron Injectable 4 milliGRAM(s) IV Push every 8 hours PRN Nausea and/or Vomiting      Allergies    penicillin (Hives; Rash)    Intolerances        SOCIAL HISTORY: Denies tobacco, etoh abuse or illicit drug use    FAMILY HISTORY:      Vital Signs Last 24 Hrs  T(C): 36.7 (02 Dec 2022 08:17), Max: 36.7 (01 Dec 2022 15:08)  T(F): 98.1 (02 Dec 2022 08:17), Max: 98.1 (01 Dec 2022 15:08)  HR: 102 (02 Dec 2022 08:45) (99 - 111)  BP: 129/68 (02 Dec 2022 08:17) (118/51 - 135/54)  BP(mean): 81 (01 Dec 2022 15:08) (81 - 81)  RR: 21 (02 Dec 2022 08:17) (18 - 22)  SpO2: 93% (02 Dec 2022 08:17) (93% - 100%)    Parameters below as of 02 Dec 2022 08:17  Patient On (Oxygen Delivery Method): room air            REVIEW OF SYSTEMS:    CONSTITUTIONAL:  As per HPI.  HEENT:  Eyes:  No diplopia or blurred vision. ENT:  No earache, sore throat or runny nose.  CARDIOVASCULAR:  No pressure, squeezing, tightness, heaviness or aching about the chest, neck, axilla or epigastrium.  RESPIRATORY:  No cough, +shortness of breath, PND or orthopnea.  GASTROINTESTINAL:  No nausea, vomiting or diarrhea.  GENITOURINARY:  No dysuria, frequency or urgency.  MUSCULOSKELETAL:  As per HPI.  SKIN:  No change in skin, hair or nails.  NEUROLOGIC:  No paresthesias, fasciculations, seizures or weakness.  PSYCHIATRIC:  No disorder of thought or mood.  ENDOCRINE:  No heat or cold intolerance, polyuria or polydipsia.  HEMATOLOGICAL:  No easy bruising or bleedings:  .     PHYSICAL EXAMINATION:    GENERAL APPEARANCE:  Pt. is not currently dyspneic, in no distress. Pt. is alert, oriented, and pleasant.  HEENT:  Pupils are normal and react normally. No icterus. Mucous membranes well colored.  NECK:  Supple. No lymphadenopathy. Jugular venous pressure not elevated. Carotids equal.   HEART:   The cardiac impulse has a normal quality. Regular. Normal S1 and S2. There are no murmurs, rubs or gallops noted  CHEST:  Chest rhonchi to auscultation. Normal respiratory effort.  ABDOMEN:  Soft and nontender.   EXTREMITIES:  There is no cyanosis, clubbing or edema.   SKIN:  No rash or significant lesions are noted.    LABS:                        12.8   18.58 )-----------( 292      ( 02 Dec 2022 08:01 )             38.8     12    140  |  108  |  11  ----------------------------<  138<H>  3.6   |  25  |  0.70    Ca    9.2      02 Dec 2022 08:01  Mg     1.8         TPro  6.0  /  Alb  2.6<L>  /  TBili  0.2  /  DBili  x   /  AST  23  /  ALT  24  /  AlkPhos  75      LIVER FUNCTIONS - ( 02 Dec 2022 08:01 )  Alb: 2.6 g/dL / Pro: 6.0 gm/dL / ALK PHOS: 75 U/L / ALT: 24 U/L / AST: 23 U/L / GGT: x                 Urinalysis Basic - ( 01 Dec 2022 17:05 )    Color: Yellow / Appearance: Clear / S.005 / pH: x  Gluc: x / Ketone: Negative  / Bili: Negative / Urobili: Negative   Blood: x / Protein: Negative / Nitrite: Negative   Leuk Esterase: Negative / RBC: 3-5 /HPF / WBC 0-2   Sq Epi: x / Non Sq Epi: Occasional / Bacteria: Occasional            RADIOLOGY & ADDITIONAL STUDIES:     Xray Chest 1 View- PORTABLE-Urgent (22 @ 16:24) >  IMPRESSION: COPD hyperexpansion of the lungs again noted.

## 2022-12-02 NOTE — PROGRESS NOTE ADULT - ASSESSMENT
73yo F here for SOB    #Acute COPD Exacerbation   -RVP: negative   -CXR as above   -Pulse ox q8 hrs  -Nebs q 6 hrs PRN  -Solumedrol 125mg IV then 40 q 6 --> solumedrol 60 q12  -c/w azithromycin + Rocephin  -c/w Spiriva + Symbicort   -Supplemental Oxygen PRN, titrate PRN for target SpO2: 92%-96%  -Pulmonary consult appreciated.     #Leukocytosis   -likely secondary to Prednisone   -continue to monitor     #Lactic Acidosis   -likely due to severe COPD   -no clinical evidence of sepsis   -s/p IV fluids   -f/u cultures   -on abx     #Hyperglycemia   -A1c 5.7  -ISS     #Hypokalemia   -replete and trend     #Hx of Crohn's/HLD/HTN  -c/w home meds. Verapamil 100mg switched to 40mg TID (pharmacy does not carry pt home's med), c/w other medications     #DVT prophylaxis  -ambulate     #Advanced Directives   -FULL CODE       *Case d/w Dr. Arteaga

## 2022-12-02 NOTE — CONSULT NOTE ADULT - SUBJECTIVE AND OBJECTIVE BOX
CHIEF COMPLAINT: Patient is a 72y old  Female who presents with a chief complaint of SOB (01 Dec 2022 18:03)      HPI:  71 y/o female with PMHx of HTN, COPD (not on home oxygen), Crohn's disease on Humira presents to ED complaining of SOB. Patient reports of shortness of breath, started about 1 week ago and been progressively worsening. Reports of non-productive cough. States she was evaluated at [facility] yesterday, had CT chest done: negative for PNA and was started on  steroids. She reports leaving AMA. Reports of worsening SOB, went to see Dr. Romana and was sent to ED for further evaluation. Reports she was started on Z-kervin and Prednisone which she took today. Reports taking Albuterol today. Patient denies fever, chills, headache, dizziness, change in vision, blurry vision, chest pain, palpitations, , abdominal pain, nausea, vomiting, diarrhea constipation,     In ED, initial vitals was stable with RR: 18, SpO2: 100% on RA. WBC: 15k, lactate 3.5. Patient received IV fluids, IV Mag and 1x dose of Levaquin  (01 Dec 2022 18:03)    12/2-patient well known to me; was SOB, hypoxic and tachypneic for over 1 week; cough worsening and went to [facility]-initial w/u was negative for ischemic disease, CTA of chest negative, emphysema and COPD with acute flare; patient left AMA and called our office to tell us she was going to go to [facility] for further evaluation.    Was not seen in office.   PAtient was given steroids and zithromax by [facility], took albuterol.   In ER, no repeat imaging done, troponin negative,  and elevated WBC, tachypnea, elevated lactate levels and admitted for COPD exacerbation.  Patient with ECHO in past year with preserved LV function and no significant valve disease      PMHx: PAST MEDICAL & SURGICAL HISTORY:   Crohn disease      HTN (hypertension)      High cholesterol            Soc Hx:       Allergies: Allergies    penicillin (Hives; Rash)    Intolerances          REVIEW OF SYSTEMS:    CONSTITUTIONAL: No weakness, fevers or chills  EYES/ENT: No visual changes;  No vertigo or throat pain   NECK: No pain or stiffness  RESPIRATORY:  cough, wheezing, shortness of breath  CARDIOVASCULAR: No chest pain or palpitations  GASTROINTESTINAL: No abdominal or epigastric pain. No nausea, vomiting, or hematemesis; No diarrhea or constipation. No melena or hematochezia.  GENITOURINARY: No dysuria, frequency or hematuria      Vital Signs Last 24 Hrs  T(C): 36.7 (01 Dec 2022 21:41), Max: 36.7 (01 Dec 2022 15:08)  T(F): 98 (01 Dec 2022 21:41), Max: 98.1 (01 Dec 2022 15:08)  HR: 111 (01 Dec 2022 21:41) (99 - 111)  BP: 135/54 (01 Dec 2022 21:41) (118/51 - 135/54)  BP(mean): 81 (01 Dec 2022 15:08) (81 - 81)  RR: 20 (01 Dec 2022 21:41) (18 - 22)  SpO2: 93% (01 Dec 2022 21:41) (93% - 100%)    Parameters below as of 01 Dec 2022 21:41  Patient On (Oxygen Delivery Method): room air        I&O's Summary      CAPILLARY BLOOD GLUCOSE      POCT Blood Glucose.: 128 mg/dL (01 Dec 2022 23:56)      PHYSICAL EXAM:   Constitutional: NAD, awake and alert, well-developed  HEENT: PERR, EOMI, Normal Hearing, MMM  Neck:  JVD  Respiratory: Breath sounds wheezing,  rhonchi  Cardiovascular: S1 and S2, regular rate and rhythm,  Murmurs,   Gastrointestinal: Bowel Sounds present, soft, nontender, nondistended, no guarding, no rebound  Extremities: No peripheral edema  Vascular: 2+ peripheral pulses    MEDICATIONS:  MEDICATIONS  (STANDING):  aspirin enteric coated 81 milliGRAM(s) Oral daily  atorvastatin 20 milliGRAM(s) Oral at bedtime  azithromycin  IVPB 500 milliGRAM(s) IV Intermittent every 24 hours  budesonide  80 MICROgram(s)/formoterol 4.5 MICROgram(s) Inhaler 2 Puff(s) Inhalation two times a day  cefTRIAXone Injectable. 1000 milliGRAM(s) IV Push every 24 hours  dextrose 5%. 1000 milliLiter(s) (100 mL/Hr) IV Continuous <Continuous>  dextrose 5%. 1000 milliLiter(s) (50 mL/Hr) IV Continuous <Continuous>  dextrose 50% Injectable 25 Gram(s) IV Push once  dextrose 50% Injectable 12.5 Gram(s) IV Push once  dextrose 50% Injectable 25 Gram(s) IV Push once  glucagon  Injectable 1 milliGRAM(s) IntraMuscular once  influenza  Vaccine (HIGH DOSE) 0.7 milliLiter(s) IntraMuscular once  insulin lispro (ADMELOG) corrective regimen sliding scale   SubCutaneous three times a day before meals  insulin lispro (ADMELOG) corrective regimen sliding scale   SubCutaneous at bedtime  lactated ringers. 1000 milliLiter(s) (75 mL/Hr) IV Continuous <Continuous>  methylPREDNISolone sodium succinate Injectable 40 milliGRAM(s) IV Push every 8 hours  nicotine - 21 mG/24Hr(s) Patch 1 Patch Transdermal daily  pantoprazole    Tablet 40 milliGRAM(s) Oral before breakfast  potassium chloride    Tablet ER 40 milliEquivalent(s) Oral once  tiotropium 2.5 MICROgram(s) Inhaler 2 Puff(s) Inhalation daily  verapamil 40 milliGRAM(s) Oral three times a day      LABS: All Labs Reviewed:                        15.3   17.56 )-----------( 304      ( 01 Dec 2022 15:50 )             45.4     12-01    137  |  102  |  13  ----------------------------<  153<H>  3.3<L>   |  25  |  0.92    Ca    10.3<H>      01 Dec 2022 15:50  Mg     1.8     12-01    TPro  7.5  /  Alb  3.3  /  TBili  0.3  /  DBili  x   /  AST  11<L>  /  ALT  16  /  AlkPhos  95  12-01          Serum Pro-Brain Natriuretic Peptide: 718 pg/mL (12-01 @ 15:50)    Blood Culture:   lipid profile       RADIOLOGY:  ACC: [id number] EXAM:  XR CHEST PORTABLE URGENT 1V                        PROCEDURE DATE:  12/01/2022      INTERPRETATION:  AP erect chest on December 1, 2022 at 4:20 PM. Patient   has chest pain.  Heart magnified by technique.  COPD hyperexpansion of the lungs again noted. No infiltrate.  No change from October 27, 2017.    IMPRESSION: COPD hyperexpansion of the lungs again noted.  --- End of Report ---    EKG:    Telemetry:    ECHO:

## 2022-12-03 LAB
ANION GAP SERPL CALC-SCNC: 7 MMOL/L — SIGNIFICANT CHANGE UP (ref 5–17)
BASOPHILS # BLD AUTO: 0.03 K/UL — SIGNIFICANT CHANGE UP (ref 0–0.2)
BASOPHILS NFR BLD AUTO: 0.2 % — SIGNIFICANT CHANGE UP (ref 0–2)
BUN SERPL-MCNC: 13 MG/DL — SIGNIFICANT CHANGE UP (ref 7–23)
CALCIUM SERPL-MCNC: 8.6 MG/DL — SIGNIFICANT CHANGE UP (ref 8.5–10.1)
CHLORIDE SERPL-SCNC: 107 MMOL/L — SIGNIFICANT CHANGE UP (ref 96–108)
CO2 SERPL-SCNC: 25 MMOL/L — SIGNIFICANT CHANGE UP (ref 22–31)
CREAT SERPL-MCNC: 0.77 MG/DL — SIGNIFICANT CHANGE UP (ref 0.5–1.3)
EGFR: 81 ML/MIN/1.73M2 — SIGNIFICANT CHANGE UP
EOSINOPHIL # BLD AUTO: 0 K/UL — SIGNIFICANT CHANGE UP (ref 0–0.5)
EOSINOPHIL NFR BLD AUTO: 0 % — SIGNIFICANT CHANGE UP (ref 0–6)
GLUCOSE SERPL-MCNC: 103 MG/DL — HIGH (ref 70–99)
HCT VFR BLD CALC: 37.1 % — SIGNIFICANT CHANGE UP (ref 34.5–45)
HGB BLD-MCNC: 12.3 G/DL — SIGNIFICANT CHANGE UP (ref 11.5–15.5)
IMM GRANULOCYTES NFR BLD AUTO: 1.8 % — HIGH (ref 0–0.9)
LACTATE SERPL-SCNC: 2.2 MMOL/L — HIGH (ref 0.7–2)
LYMPHOCYTES # BLD AUTO: 22.2 % — SIGNIFICANT CHANGE UP (ref 13–44)
LYMPHOCYTES # BLD AUTO: 3.12 K/UL — SIGNIFICANT CHANGE UP (ref 1–3.3)
MCHC RBC-ENTMCNC: 29.1 PG — SIGNIFICANT CHANGE UP (ref 27–34)
MCHC RBC-ENTMCNC: 33.2 GM/DL — SIGNIFICANT CHANGE UP (ref 32–36)
MCV RBC AUTO: 87.9 FL — SIGNIFICANT CHANGE UP (ref 80–100)
MONOCYTES # BLD AUTO: 1.08 K/UL — HIGH (ref 0–0.9)
MONOCYTES NFR BLD AUTO: 7.7 % — SIGNIFICANT CHANGE UP (ref 2–14)
NEUTROPHILS # BLD AUTO: 9.6 K/UL — HIGH (ref 1.8–7.4)
NEUTROPHILS NFR BLD AUTO: 68.1 % — SIGNIFICANT CHANGE UP (ref 43–77)
PLATELET # BLD AUTO: 271 K/UL — SIGNIFICANT CHANGE UP (ref 150–400)
POTASSIUM SERPL-MCNC: 3.1 MMOL/L — LOW (ref 3.5–5.3)
POTASSIUM SERPL-SCNC: 3.1 MMOL/L — LOW (ref 3.5–5.3)
RBC # BLD: 4.22 M/UL — SIGNIFICANT CHANGE UP (ref 3.8–5.2)
RBC # FLD: 13.2 % — SIGNIFICANT CHANGE UP (ref 10.3–14.5)
SODIUM SERPL-SCNC: 139 MMOL/L — SIGNIFICANT CHANGE UP (ref 135–145)
WBC # BLD: 14.08 K/UL — HIGH (ref 3.8–10.5)
WBC # FLD AUTO: 14.08 K/UL — HIGH (ref 3.8–10.5)

## 2022-12-03 PROCEDURE — 99233 SBSQ HOSP IP/OBS HIGH 50: CPT | Mod: GC

## 2022-12-03 RX ORDER — POTASSIUM CHLORIDE 20 MEQ
20 PACKET (EA) ORAL
Refills: 0 | Status: COMPLETED | OUTPATIENT
Start: 2022-12-03 | End: 2022-12-03

## 2022-12-03 RX ADMIN — CEFTRIAXONE 1000 MILLIGRAM(S): 500 INJECTION, POWDER, FOR SOLUTION INTRAMUSCULAR; INTRAVENOUS at 00:41

## 2022-12-03 RX ADMIN — SODIUM CHLORIDE 75 MILLILITER(S): 9 INJECTION, SOLUTION INTRAVENOUS at 01:50

## 2022-12-03 RX ADMIN — ALBUTEROL 2.5 MILLIGRAM(S): 90 AEROSOL, METERED ORAL at 08:01

## 2022-12-03 RX ADMIN — Medication 20 MILLIEQUIVALENT(S): at 11:09

## 2022-12-03 RX ADMIN — CEFTRIAXONE 1000 MILLIGRAM(S): 500 INJECTION, POWDER, FOR SOLUTION INTRAMUSCULAR; INTRAVENOUS at 21:23

## 2022-12-03 RX ADMIN — Medication 40 MILLIGRAM(S): at 11:09

## 2022-12-03 RX ADMIN — Medication 3 MILLIGRAM(S): at 21:22

## 2022-12-03 RX ADMIN — AZITHROMYCIN 255 MILLIGRAM(S): 500 TABLET, FILM COATED ORAL at 01:00

## 2022-12-03 RX ADMIN — Medication 600 MILLIGRAM(S): at 11:09

## 2022-12-03 RX ADMIN — Medication 650 MILLIGRAM(S): at 08:09

## 2022-12-03 RX ADMIN — Medication 20 MILLIEQUIVALENT(S): at 13:53

## 2022-12-03 RX ADMIN — PANTOPRAZOLE SODIUM 40 MILLIGRAM(S): 20 TABLET, DELAYED RELEASE ORAL at 05:57

## 2022-12-03 RX ADMIN — BUDESONIDE AND FORMOTEROL FUMARATE DIHYDRATE 2 PUFF(S): 160; 4.5 AEROSOL RESPIRATORY (INHALATION) at 08:02

## 2022-12-03 RX ADMIN — ALBUTEROL 2.5 MILLIGRAM(S): 90 AEROSOL, METERED ORAL at 13:52

## 2022-12-03 RX ADMIN — Medication 600 MILLIGRAM(S): at 21:22

## 2022-12-03 RX ADMIN — Medication 20 MILLIEQUIVALENT(S): at 15:00

## 2022-12-03 RX ADMIN — ATORVASTATIN CALCIUM 20 MILLIGRAM(S): 80 TABLET, FILM COATED ORAL at 21:22

## 2022-12-03 RX ADMIN — AZITHROMYCIN 255 MILLIGRAM(S): 500 TABLET, FILM COATED ORAL at 21:21

## 2022-12-03 RX ADMIN — BUDESONIDE AND FORMOTEROL FUMARATE DIHYDRATE 2 PUFF(S): 160; 4.5 AEROSOL RESPIRATORY (INHALATION) at 21:57

## 2022-12-03 RX ADMIN — TIOTROPIUM BROMIDE 2 PUFF(S): 18 CAPSULE ORAL; RESPIRATORY (INHALATION) at 08:02

## 2022-12-03 RX ADMIN — Medication 81 MILLIGRAM(S): at 11:08

## 2022-12-03 NOTE — PROGRESS NOTE ADULT - ASSESSMENT
PROBLEMS:    Acute COPD Exacerbation   Hyperglycemia  Hypercalcemia  Hx of Crohn's / HLD / HTN    PLAN;    pulmonary stable- decd planning  chest PT   Nebs q 6 hrs ATC & PRN  po prednisone  Azithromycin + Rocephin ABX   Spiriva + Symbicort   Supplemental Oxygen PRN-titrate PRN for target SpO2: 92%-96%  DVT prophylaxis-ambulate

## 2022-12-03 NOTE — PROGRESS NOTE ADULT - ASSESSMENT
71yo F here for SOB    #Acute COPD Exacerbation   -RVP: negative   -CXR as above   -Pulse ox q8 hrs  -Nebs q 6 hrs PRN  -Solumedrol 125mg IV then 40 q 6 --> solumedrol 60 q12 -> 12/3 started prednisone 40   -c/w azithromycin + Rocephin  -c/w Spiriva + Symbicort, nebulizer treatments  -Pulmonary consult appreciated.   -12/3: Observe on po prednisone today. Airways opening up. Reevaluate for possible DC tomorrow 12/4    #Leukocytosis   -likely secondary to steroids  -continue to monitor     #Lactic Acidosis   -likely due to severe COPD   -no clinical evidence of sepsis   -s/p IV fluids   -f/u cultures   -on abx     #Hyperglycemia   -A1c 5.7  -ISS     #Hypokalemia   -replete and trend     #Hx of Crohn's/HLD/HTN  -c/w home meds. Verapamil 100mg switched to 40mg TID (pharmacy does not carry pt home's med), c/w other medications     #DVT prophylaxis  -ambulate     #Advanced Directives   -FULL CODE       *Case d/w Dr. Arteaga

## 2022-12-04 ENCOUNTER — TRANSCRIPTION ENCOUNTER (OUTPATIENT)
Age: 72
End: 2022-12-04

## 2022-12-04 VITALS
DIASTOLIC BLOOD PRESSURE: 83 MMHG | RESPIRATION RATE: 21 BRPM | OXYGEN SATURATION: 100 % | HEART RATE: 79 BPM | SYSTOLIC BLOOD PRESSURE: 141 MMHG

## 2022-12-04 LAB
MAGNESIUM SERPL-MCNC: 1.8 MG/DL — SIGNIFICANT CHANGE UP (ref 1.6–2.6)
POTASSIUM SERPL-MCNC: 3.8 MMOL/L — SIGNIFICANT CHANGE UP (ref 3.5–5.3)
POTASSIUM SERPL-SCNC: 3.8 MMOL/L — SIGNIFICANT CHANGE UP (ref 3.5–5.3)

## 2022-12-04 PROCEDURE — 99239 HOSP IP/OBS DSCHRG MGMT >30: CPT

## 2022-12-04 RX ORDER — ALBUTEROL 90 UG/1
2 AEROSOL, METERED ORAL
Qty: 0 | Refills: 0 | DISCHARGE

## 2022-12-04 RX ORDER — ASPIRIN/CALCIUM CARB/MAGNESIUM 324 MG
1 TABLET ORAL
Qty: 0 | Refills: 0 | DISCHARGE

## 2022-12-04 RX ORDER — AZITHROMYCIN 500 MG/1
1 TABLET, FILM COATED ORAL
Qty: 3 | Refills: 0
Start: 2022-12-04 | End: 2022-12-06

## 2022-12-04 RX ORDER — NICOTINE POLACRILEX 2 MG
1 GUM BUCCAL
Qty: 7 | Refills: 0
Start: 2022-12-04 | End: 2022-12-10

## 2022-12-04 RX ORDER — TIOTROPIUM BROMIDE 18 UG/1
2 CAPSULE ORAL; RESPIRATORY (INHALATION)
Qty: 60 | Refills: 0
Start: 2022-12-04 | End: 2023-01-02

## 2022-12-04 RX ORDER — VERAPAMIL HCL 240 MG
1 CAPSULE, EXTENDED RELEASE PELLETS 24 HR ORAL
Qty: 0 | Refills: 0 | DISCHARGE

## 2022-12-04 RX ORDER — AZITHROMYCIN 500 MG/1
1 TABLET, FILM COATED ORAL
Qty: 5 | Refills: 0
Start: 2022-12-04 | End: 2022-12-08

## 2022-12-04 RX ORDER — CHOLECALCIFEROL (VITAMIN D3) 125 MCG
1 CAPSULE ORAL
Qty: 0 | Refills: 0 | DISCHARGE

## 2022-12-04 RX ORDER — BUDESONIDE AND FORMOTEROL FUMARATE DIHYDRATE 160; 4.5 UG/1; UG/1
2 AEROSOL RESPIRATORY (INHALATION)
Qty: 120 | Refills: 0
Start: 2022-12-04 | End: 2023-01-02

## 2022-12-04 RX ORDER — ROSUVASTATIN CALCIUM 5 MG/1
1 TABLET ORAL
Qty: 0 | Refills: 0 | DISCHARGE

## 2022-12-04 RX ORDER — OMEPRAZOLE 10 MG/1
1 CAPSULE, DELAYED RELEASE ORAL
Qty: 0 | Refills: 0 | DISCHARGE

## 2022-12-04 RX ORDER — ADALIMUMAB 40MG/0.8ML
0 KIT SUBCUTANEOUS
Qty: 0 | Refills: 0 | DISCHARGE

## 2022-12-04 RX ADMIN — TIOTROPIUM BROMIDE 2 PUFF(S): 18 CAPSULE ORAL; RESPIRATORY (INHALATION) at 08:02

## 2022-12-04 RX ADMIN — Medication 40 MILLIGRAM(S): at 09:46

## 2022-12-04 RX ADMIN — Medication 600 MILLIGRAM(S): at 09:46

## 2022-12-04 RX ADMIN — Medication 650 MILLIGRAM(S): at 09:50

## 2022-12-04 RX ADMIN — ALBUTEROL 2.5 MILLIGRAM(S): 90 AEROSOL, METERED ORAL at 08:01

## 2022-12-04 RX ADMIN — BUDESONIDE AND FORMOTEROL FUMARATE DIHYDRATE 2 PUFF(S): 160; 4.5 AEROSOL RESPIRATORY (INHALATION) at 08:01

## 2022-12-04 RX ADMIN — Medication 81 MILLIGRAM(S): at 09:46

## 2022-12-04 RX ADMIN — PANTOPRAZOLE SODIUM 40 MILLIGRAM(S): 20 TABLET, DELAYED RELEASE ORAL at 09:46

## 2022-12-04 NOTE — DISCHARGE NOTE PROVIDER - NSDCMRMEDTOKEN_GEN_ALL_CORE_FT
Albuterol (Eqv-ProAir HFA) 90 mcg/inh inhalation aerosol: 2 puff(s) inhaled every 6 hours, As Needed  Aspir 81 oral delayed release tablet: 1 tab(s) orally once a day  azithromycin 250 mg oral tablet: 1 tab(s) orally once a day   guaiFENesin 600 mg oral tablet, extended release: 1 tab(s) orally every 12 hours  Humira 40 mg/0.8 mL subcutaneous kit: subcutaneous every 2 weeks  nicotine 21 mg/24 hr transdermal film, extended release: 1 patch transdermal once a day   predniSONE 10 mg oral tablet: 4 tab(s) orally once a day x 3 days  3 tab(s) orally once a day x 3 days  2 tab(s) orally once a day x 3 days  1 tab(s) orally once a day x 3 days  PriLOSEC 20 mg oral delayed release capsule: 1 cap(s) orally once a day  rosuvastatin 5 mg oral tablet: 1 tab(s) orally once a day (at bedtime)  Spiriva Respimat 10 ACT 2.5 mcg/inh inhalation aerosol: 2 puff(s) inhaled once a day  Symbicort 80 mcg-4.5 mcg/inh inhalation aerosol: 2 puff(s) inhaled 2 times a day   verapamil 100 mg/24 hours oral capsule, extended release: 1 cap(s) orally once a day (at bedtime)  Vitamin D3 50 mcg (2000 intl units) oral capsule: 1 cap(s) orally once a day

## 2022-12-04 NOTE — PROGRESS NOTE ADULT - SUBJECTIVE AND OBJECTIVE BOX
HPI:  73 y/o female with PMHx of HTN, COPD (not on home oxygen), Crohn's disease on Humira presents to ED complaining of SOB. Patient reports of shortness of breath, started about 1 week ago and been progressively worsening. Reports of non-productive cough. States she was evaluated at [facility] yesterday, had CT chest done: negative for PNA and was started on  steroids. She reports leaving AMA. Reports of worsening SOB, went to see Dr. Romana and was sent to ED for further evaluation. Reports she was started on Z-kervin and Prednisone which she took today. Reports taking Albuterol today. Patient denies fever, chills, headache, dizziness, change in vision, blurry vision, chest pain, palpitations, , abdominal pain, nausea, vomiting, diarrhea constipation,     In ED, initial vitals was stable with RR: 18, SpO2: 100% on RA. WBC: 15k, lactate 3.5. Patient received IV fluids, IV Mag and 1x dose of Levaquin  (01 Dec 2022 18:03)      SUBJECTIVE:   Patient was seen and examined in bedside. NAD. Patient is off supplemental O2 and is not SOB. She states she is feeling much better today, and wants to be dc as soon as possible because she has a lot to prepare for the holidays.      REVIEW OF SYSTEMS:  CONSTITUTIONAL: No weakness, fevers or chills  EYES/ENT: No visual changes;  No vertigo or throat pain   NECK: No pain or stiffness  RESPIRATORY: +cough, wheezing. No hortness of breath  CARDIOVASCULAR: No chest pain or palpitations  GASTROINTESTINAL: No abdominal or epigastric pain. No nausea, vomiting, GENITOURINARY: No dysuria, frequency or hematuria  NEUROLOGICAL: No numbness or weakness      Vital Signs Last 24 Hrs  T(C): 37 (02 Dec 2022 16:50), Max: 37 (02 Dec 2022 16:50)  T(F): 98.6 (02 Dec 2022 16:50), Max: 98.6 (02 Dec 2022 16:50)  HR: 82 (02 Dec 2022 17:31) (82 - 111)  BP: 137/76 (02 Dec 2022 16:50) (118/51 - 137/76)  RR: 20 (02 Dec 2022 16:50) (20 - 22)  SpO2: 93% (02 Dec 2022 16:50) (93% - 98%)    Parameters below as of 02 Dec 2022 16:50  Patient On (Oxygen Delivery Method): room air      PHYSICAL EXAM:  General: Awake and alert. No acute distress.   Skin: Warm, dry, and pink.   Eyes: Pupils equal and reactive to light. Extraocular eye movements intact. No conjunctival injection, discharge, or scleral icterus.   HEENT: Atraumatic, normocephalic. Moist mucus membranes.   Cardiology: Normal S1, S2. Regular rate and rhythm.   Respiratory: +wheezes. Unlabored breathing.  Gastrointestinal: Positive bowel sounds. Soft, non-tender, non-distended.  Extremities: No peripheral edema bilaterally.  Neurological: A&Ox3. Normal speech.  Psychiatric: Normal affect. Normal mood.  Skin: No rashes or lesions    LABS: All Labs Reviewed:                        12.8   18.58 )-----------( 292      ( 02 Dec 2022 08:01 )             38.8     12-02    140  |  108  |  11  ----------------------------<  138<H>  3.6   |  25  |  0.70    Ca    9.2      02 Dec 2022 08:01  Mg     1.8     12-01    TPro  6.0  /  Alb  2.6<L>  /  TBili  0.2  /  DBili  x   /  AST  23  /  ALT  24  /  AlkPhos  75  12-02          MEDICATIONS  (STANDING):  albuterol    0.083% 2.5 milliGRAM(s) Nebulizer every 6 hours  aspirin enteric coated 81 milliGRAM(s) Oral daily  atorvastatin 20 milliGRAM(s) Oral at bedtime  azithromycin  IVPB 500 milliGRAM(s) IV Intermittent every 24 hours  budesonide  80 MICROgram(s)/formoterol 4.5 MICROgram(s) Inhaler 2 Puff(s) Inhalation two times a day  cefTRIAXone Injectable. 1000 milliGRAM(s) IV Push every 24 hours  dextrose 5%. 1000 milliLiter(s) (100 mL/Hr) IV Continuous <Continuous>  dextrose 5%. 1000 milliLiter(s) (50 mL/Hr) IV Continuous <Continuous>  dextrose 50% Injectable 25 Gram(s) IV Push once  dextrose 50% Injectable 12.5 Gram(s) IV Push once  dextrose 50% Injectable 25 Gram(s) IV Push once  glucagon  Injectable 1 milliGRAM(s) IntraMuscular once  guaiFENesin  milliGRAM(s) Oral every 12 hours  influenza  Vaccine (HIGH DOSE) 0.7 milliLiter(s) IntraMuscular once  insulin lispro (ADMELOG) corrective regimen sliding scale   SubCutaneous three times a day before meals  insulin lispro (ADMELOG) corrective regimen sliding scale   SubCutaneous at bedtime  nicotine - 21 mG/24Hr(s) Patch 1 Patch Transdermal daily  pantoprazole    Tablet 40 milliGRAM(s) Oral before breakfast  potassium chloride    Tablet ER 20 milliEquivalent(s) Oral every 2 hours  predniSONE   Tablet 40 milliGRAM(s) Oral daily  tiotropium 2.5 MICROgram(s) Inhaler 2 Puff(s) Inhalation daily  verapamil 40 milliGRAM(s) Oral three times a day      Blood Culture:     RADIOLOGY/EKG:  < from: Xray Chest 1 View- PORTABLE-Urgent (12.01.22 @ 16:24) >    IMPRESSION: COPD hyperexpansion of the lungs again noted.    < end of copied text >  
Pt has been seen and examined with FP resident, resident supervised agree with a/p       Patient is a 72y old  Female who presents with a chief complaint of SOB (02 Dec 2022 07:42)      HPI:  71 y/o female with PMHx of HTN, COPD (not on home oxygen), Crohn's disease on Humira presents to ED complaining of SOB.      PHYSICAL EXAM:        -rs-Poor air entry   -cvs-s1s2 normal   -p/a-soft,bs+      A/P    #d/c today with further management as an outpt, time spent 45 minutes     #counselled not to smoke and stressed compliance with follow up. All questions have been answered 
Subjective:    pat better, sitting in bed, no respiratory distress.    Home Medications:  Albuterol (Eqv-ProAir HFA) 90 mcg/inh inhalation aerosol: 2 puff(s) inhaled every 6 hours, As Needed (01 Dec 2022 19:31)  Aspir 81 oral delayed release tablet: 1 tab(s) orally once a day (01 Dec 2022 19:31)  azithromycin 250 mg oral tablet: ***take 2 tablets by mouth today, then take 1 tablet daily for 4 days*** (01 Dec 2022 19:31)  Humira 40 mg/0.8 mL subcutaneous kit: subcutaneous every 2 weeks (01 Dec 2022 19:31)  predniSONE 50 mg oral tablet: 1 tab(s) orally once a day (01 Dec 2022 19:31)  PriLOSEC 20 mg oral delayed release capsule: 1 cap(s) orally once a day (01 Dec 2022 19:31)  rosuvastatin 5 mg oral tablet: 1 tab(s) orally once a day (at bedtime) (01 Dec 2022 19:31)  verapamil 100 mg/24 hours oral capsule, extended release: 1 cap(s) orally once a day (at bedtime) (01 Dec 2022 19:31)  Vitamin D3 50 mcg (2000 intl units) oral capsule: 1 cap(s) orally once a day (01 Dec 2022 19:31)    MEDICATIONS  (STANDING):  albuterol    0.083% 2.5 milliGRAM(s) Nebulizer every 6 hours  aspirin enteric coated 81 milliGRAM(s) Oral daily  atorvastatin 20 milliGRAM(s) Oral at bedtime  azithromycin  IVPB 500 milliGRAM(s) IV Intermittent every 24 hours  budesonide  80 MICROgram(s)/formoterol 4.5 MICROgram(s) Inhaler 2 Puff(s) Inhalation two times a day  cefTRIAXone Injectable. 1000 milliGRAM(s) IV Push every 24 hours  dextrose 5%. 1000 milliLiter(s) (100 mL/Hr) IV Continuous <Continuous>  dextrose 5%. 1000 milliLiter(s) (50 mL/Hr) IV Continuous <Continuous>  dextrose 50% Injectable 25 Gram(s) IV Push once  dextrose 50% Injectable 12.5 Gram(s) IV Push once  dextrose 50% Injectable 25 Gram(s) IV Push once  glucagon  Injectable 1 milliGRAM(s) IntraMuscular once  guaiFENesin  milliGRAM(s) Oral every 12 hours  influenza  Vaccine (HIGH DOSE) 0.7 milliLiter(s) IntraMuscular once  insulin lispro (ADMELOG) corrective regimen sliding scale   SubCutaneous three times a day before meals  insulin lispro (ADMELOG) corrective regimen sliding scale   SubCutaneous at bedtime  nicotine - 21 mG/24Hr(s) Patch 1 Patch Transdermal daily  pantoprazole    Tablet 40 milliGRAM(s) Oral before breakfast  predniSONE   Tablet 40 milliGRAM(s) Oral daily  tiotropium 2.5 MICROgram(s) Inhaler 2 Puff(s) Inhalation daily  verapamil 40 milliGRAM(s) Oral three times a day    MEDICATIONS  (PRN):  acetaminophen     Tablet .. 650 milliGRAM(s) Oral every 6 hours PRN Temp greater or equal to 38C (100.4F), Mild Pain (1 - 3)  albuterol    90 MICROgram(s) HFA Inhaler 2 Puff(s) Inhalation every 6 hours PRN Bronchospasm  aluminum hydroxide/magnesium hydroxide/simethicone Suspension 30 milliLiter(s) Oral every 4 hours PRN Dyspepsia  dextrose Oral Gel 15 Gram(s) Oral once PRN Blood Glucose LESS THAN 70 milliGRAM(s)/deciliter  melatonin 3 milliGRAM(s) Oral at bedtime PRN Insomnia  ondansetron Injectable 4 milliGRAM(s) IV Push every 8 hours PRN Nausea and/or Vomiting      Allergies    penicillin (Hives; Rash)    Intolerances        Vital Signs Last 24 Hrs  T(C): 36.5 (04 Dec 2022 00:04), Max: 36.5 (04 Dec 2022 00:04)  T(F): 97.7 (04 Dec 2022 00:04), Max: 97.7 (04 Dec 2022 00:04)  HR: 89 (04 Dec 2022 08:12) (79 - 107)  BP: 141/83 (04 Dec 2022 08:11) (122/77 - 142/77)  BP(mean): --  RR: 21 (04 Dec 2022 08:11) (20 - 21)  SpO2: 100% (04 Dec 2022 08:11) (95% - 100%)    Parameters below as of 04 Dec 2022 08:11  Patient On (Oxygen Delivery Method): room air          PHYSICAL EXAMINATION:    NECK:  Supple. No lymphadenopathy. Jugular venous pressure not elevated. Carotids equal.   HEART:   The cardiac impulse has a normal quality. Reg., Nl S1 and S2.  There are no murmurs, rubs or gallops noted  CHEST:  Chest rhonchi to auscultation. Normal respiratory effort.  ABDOMEN:  Soft and nontender.   EXTREMITIES:  There is no edema.       LABS:                        12.3   14.08 )-----------( 271      ( 03 Dec 2022 08:52 )             37.1     12-04    x   |  x   |  x   ----------------------------<  x   3.8   |  x   |  x     Ca    8.6      03 Dec 2022 08:52  Mg     1.8     12-04                
Subjective:    pat better, sitting in bed, on mucinus.    Home Medications:  Albuterol (Eqv-ProAir HFA) 90 mcg/inh inhalation aerosol: 2 puff(s) inhaled every 6 hours, As Needed (01 Dec 2022 19:31)  Aspir 81 oral delayed release tablet: 1 tab(s) orally once a day (01 Dec 2022 19:31)  azithromycin 250 mg oral tablet: ***take 2 tablets by mouth today, then take 1 tablet daily for 4 days*** (01 Dec 2022 19:)  Humira 40 mg/0.8 mL subcutaneous kit: subcutaneous every 2 weeks (01 Dec 2022 19:)  predniSONE 50 mg oral tablet: 1 tab(s) orally once a day (01 Dec 2022 19:31)  PriLOSEC 20 mg oral delayed release capsule: 1 cap(s) orally once a day (01 Dec 2022 19:31)  rosuvastatin 5 mg oral tablet: 1 tab(s) orally once a day (at bedtime) (01 Dec 2022 19:31)  verapamil 100 mg/24 hours oral capsule, extended release: 1 cap(s) orally once a day (at bedtime) (01 Dec 2022 19:31)  Vitamin D3 50 mcg (2000 intl units) oral capsule: 1 cap(s) orally once a day (01 Dec 2022 19:31)    MEDICATIONS  (STANDING):  albuterol    0.083% 2.5 milliGRAM(s) Nebulizer every 6 hours  aspirin enteric coated 81 milliGRAM(s) Oral daily  atorvastatin 20 milliGRAM(s) Oral at bedtime  azithromycin  IVPB 500 milliGRAM(s) IV Intermittent every 24 hours  budesonide  80 MICROgram(s)/formoterol 4.5 MICROgram(s) Inhaler 2 Puff(s) Inhalation two times a day  cefTRIAXone Injectable. 1000 milliGRAM(s) IV Push every 24 hours  dextrose 5%. 1000 milliLiter(s) (100 mL/Hr) IV Continuous <Continuous>  dextrose 5%. 1000 milliLiter(s) (50 mL/Hr) IV Continuous <Continuous>  dextrose 50% Injectable 25 Gram(s) IV Push once  dextrose 50% Injectable 12.5 Gram(s) IV Push once  dextrose 50% Injectable 25 Gram(s) IV Push once  glucagon  Injectable 1 milliGRAM(s) IntraMuscular once  guaiFENesin  milliGRAM(s) Oral every 12 hours  influenza  Vaccine (HIGH DOSE) 0.7 milliLiter(s) IntraMuscular once  insulin lispro (ADMELOG) corrective regimen sliding scale   SubCutaneous three times a day before meals  insulin lispro (ADMELOG) corrective regimen sliding scale   SubCutaneous at bedtime  nicotine - 21 mG/24Hr(s) Patch 1 Patch Transdermal daily  pantoprazole    Tablet 40 milliGRAM(s) Oral before breakfast  potassium chloride    Tablet ER 20 milliEquivalent(s) Oral every 2 hours  predniSONE   Tablet 40 milliGRAM(s) Oral daily  tiotropium 2.5 MICROgram(s) Inhaler 2 Puff(s) Inhalation daily  verapamil 40 milliGRAM(s) Oral three times a day    MEDICATIONS  (PRN):  acetaminophen     Tablet .. 650 milliGRAM(s) Oral every 6 hours PRN Temp greater or equal to 38C (100.4F), Mild Pain (1 - 3)  albuterol    90 MICROgram(s) HFA Inhaler 2 Puff(s) Inhalation every 6 hours PRN Bronchospasm  aluminum hydroxide/magnesium hydroxide/simethicone Suspension 30 milliLiter(s) Oral every 4 hours PRN Dyspepsia  dextrose Oral Gel 15 Gram(s) Oral once PRN Blood Glucose LESS THAN 70 milliGRAM(s)/deciliter  melatonin 3 milliGRAM(s) Oral at bedtime PRN Insomnia  ondansetron Injectable 4 milliGRAM(s) IV Push every 8 hours PRN Nausea and/or Vomiting      Allergies    penicillin (Hives; Rash)    Intolerances        Vital Signs Last 24 Hrs  T(C): 36.8 (03 Dec 2022 09:37), Max: 37 (02 Dec 2022 16:50)  T(F): 98.2 (03 Dec 2022 09:37), Max: 98.6 (02 Dec 2022 16:50)  HR: 87 (03 Dec 2022 09:37) (82 - 104)  BP: 123/81 (03 Dec 2022 09:37) (123/81 - 137/76)  BP(mean): --  RR: 20 (03 Dec 2022 09:37) (20 - 20)  SpO2: 100% (03 Dec 2022 09:37) (93% - 100%)    Parameters below as of 03 Dec 2022 09:37  Patient On (Oxygen Delivery Method): room air          PHYSICAL EXAMINATION:    NECK:  Supple. No lymphadenopathy. Jugular venous pressure not elevated. Carotids equal.   HEART:   The cardiac impulse has a normal quality. Reg., Nl S1 and S2.  There are no murmurs, rubs or gallops noted  CHEST:  Chest rhonchi to auscultation. Normal respiratory effort.  ABDOMEN:  Soft and nontender.   EXTREMITIES:  There is no edema.       LABS:                        12.3   14.08 )-----------( 271      ( 03 Dec 2022 08:52 )             37.1         139  |  107  |  13  ----------------------------<  103<H>  3.1<L>   |  25  |  0.77    Ca    8.6      03 Dec 2022 08:52  Mg     1.8         TPro  6.0  /  Alb  2.6<L>  /  TBili  0.2  /  DBili  x   /  AST  23  /  ALT  24  /  AlkPhos  75        Urinalysis Basic - ( 01 Dec 2022 17:05 )    Color: Yellow / Appearance: Clear / S.005 / pH: x  Gluc: x / Ketone: Negative  / Bili: Negative / Urobili: Negative   Blood: x / Protein: Negative / Nitrite: Negative   Leuk Esterase: Negative / RBC: 3-5 /HPF / WBC 0-2   Sq Epi: x / Non Sq Epi: Occasional / Bacteria: Occasional            
Pt has been seen and examined with FP resident, resident supervised agree with a/p       Patient is a 72y old  Female who presents with a chief complaint of SOB (02 Dec 2022 07:42)      HPI:  73 y/o female with PMHx of HTN, COPD (not on home oxygen), Crohn's disease on Humira presents to ED complaining of SOB.      PHYSICAL EXAM:        -rs-Poor air entry   -cvs-s1s2 normal   -p/a-soft,bs+      A/P    #ct steroids, abx, supportive care     #d/c plan     #Elevated lactate due to hypoxia and not dur to sepsis 
Pt has been seen and examined with FP resident, resident supervised agree with a/p       Patient is a 72y old  Female who presents with a chief complaint of SOB (02 Dec 2022 07:42)      HPI:  73 y/o female with PMHx of HTN, COPD (not on home oxygen), Crohn's disease on Humira presents to ED complaining of SOB.      PHYSICAL EXAM:  Vital Signs Last 24 Hrs  T(C): 36.7 (02 Dec 2022 08:17), Max: 36.7 (01 Dec 2022 15:08)  T(F): 98.1 (02 Dec 2022 08:17), Max: 98.1 (01 Dec 2022 15:08)  HR: 102 (02 Dec 2022 08:45) (99 - 111)  BP: 129/68 (02 Dec 2022 08:17) (118/51 - 135/54)  BP(mean): 81 (01 Dec 2022 15:08) (81 - 81)  RR: 21 (02 Dec 2022 08:17) (18 - 22)  SpO2: 93% (02 Dec 2022 08:17) (93% - 100%)    Parameters below as of 02 Dec 2022 08:17  Patient On (Oxygen Delivery Method): room air      -rs-Poor air entry   -cvs-s1s2 normal   -p/a-soft,bs+      A/P    #ct steroids, abx, supportive care     #d/c plan     
HPI:  73 y/o female with PMHx of HTN, COPD (not on home oxygen), Crohn's disease on Humira presents to ED complaining of SOB. Patient reports of shortness of breath, started about 1 week ago and been progressively worsening. Reports of non-productive cough. States she was evaluated at [facility] yesterday, had CT chest done: negative for PNA and was started on  steroids. She reports leaving AMA. Reports of worsening SOB, went to see Dr. Romana and was sent to ED for further evaluation. Reports she was started on Z-kervin and Prednisone which she took today. Reports taking Albuterol today. Patient denies fever, chills, headache, dizziness, change in vision, blurry vision, chest pain, palpitations, , abdominal pain, nausea, vomiting, diarrhea constipation,     In ED, initial vitals was stable with RR: 18, SpO2: 100% on RA. WBC: 15k, lactate 3.5. Patient received IV fluids, IV Mag and 1x dose of Levaquin  (01 Dec 2022 18:03)      SUBJECTIVE:   Patient was seen and examined in bedside. NAD. Patient is off supplemental O2 and is not SOB. She states she is feeling much better today, and wants to be dc as soon as possible because she has a lot to prepare for the holidays.      REVIEW OF SYSTEMS:  CONSTITUTIONAL: No weakness, fevers or chills  EYES/ENT: No visual changes;  No vertigo or throat pain   NECK: No pain or stiffness  RESPIRATORY: +cough, wheezing. No hortness of breath  CARDIOVASCULAR: No chest pain or palpitations  GASTROINTESTINAL: No abdominal or epigastric pain. No nausea, vomiting, GENITOURINARY: No dysuria, frequency or hematuria  NEUROLOGICAL: No numbness or weakness      Vital Signs Last 24 Hrs  T(C): 37 (02 Dec 2022 16:50), Max: 37 (02 Dec 2022 16:50)  T(F): 98.6 (02 Dec 2022 16:50), Max: 98.6 (02 Dec 2022 16:50)  HR: 82 (02 Dec 2022 17:31) (82 - 111)  BP: 137/76 (02 Dec 2022 16:50) (118/51 - 137/76)  RR: 20 (02 Dec 2022 16:50) (20 - 22)  SpO2: 93% (02 Dec 2022 16:50) (93% - 98%)    Parameters below as of 02 Dec 2022 16:50  Patient On (Oxygen Delivery Method): room air      PHYSICAL EXAM:  General: Awake and alert. No acute distress.   Skin: Warm, dry, and pink.   Eyes: Pupils equal and reactive to light. Extraocular eye movements intact. No conjunctival injection, discharge, or scleral icterus.   HEENT: Atraumatic, normocephalic. Moist mucus membranes.   Cardiology: Normal S1, S2. Regular rate and rhythm.   Respiratory: +wheezes. Unlabored breathing.  Gastrointestinal: Positive bowel sounds. Soft, non-tender, non-distended.  Extremities: No peripheral edema bilaterally.  Neurological: A&Ox3. Normal speech.  Psychiatric: Normal affect. Normal mood.  Skin: No rashes or lesions    LABS: All Labs Reviewed:                        12.8   18.58 )-----------( 292      ( 02 Dec 2022 08:01 )             38.8     12-02    140  |  108  |  11  ----------------------------<  138<H>  3.6   |  25  |  0.70    Ca    9.2      02 Dec 2022 08:01  Mg     1.8     12-01    TPro  6.0  /  Alb  2.6<L>  /  TBili  0.2  /  DBili  x   /  AST  23  /  ALT  24  /  AlkPhos  75  12-02          Blood Culture:     RADIOLOGY/EKG:  < from: Xray Chest 1 View- PORTABLE-Urgent (12.01.22 @ 16:24) >    IMPRESSION: COPD hyperexpansion of the lungs again noted.    < end of copied text >

## 2022-12-04 NOTE — DISCHARGE NOTE PROVIDER - CARE PROVIDER_API CALL
Dominik Fulton)  Cardiovascular Disease; Internal Medicine  [address], [address]  [place] [address]  Phone: [phone]  Fax: [phone]  Follow Up Time:

## 2022-12-04 NOTE — PROGRESS NOTE ADULT - ASSESSMENT
PROBLEMS:    Acute COPD Exacerbation   Hyperglycemia  Hypercalcemia  Hx of Crohn's / HLD / HTN    PLAN;    pulmonary stable- decd planning  chest PT   Nebs q 6 hrs ATC & PRN  po prednisone 40mg daily  Azithromycin + Rocephin ABX   Spiriva + Symbicort   Supplemental Oxygen PRN-titrate PRN for target SpO2: 92%-96%  DVT prophylaxis-ambulate

## 2022-12-04 NOTE — DISCHARGE NOTE NURSING/CASE MANAGEMENT/SOCIAL WORK - PATIENT PORTAL LINK FT
You can access the FollowMyHealth Patient Portal offered by [facility] by registering at the following website: [email or web address]. By joining [facility]’s FollowMyHealth portal, you will also be able to view your health information using other applications (apps) compatible with our system.

## 2022-12-04 NOTE — DISCHARGE NOTE PROVIDER - HOSPITAL COURSE
73 y/o female with PMHx of HTN, COPD (not on home oxygen), Crohn's disease on Humira presents to ED complaining of SOB. Patient reports of shortness of breath, started about 1 week ago and been progressively with non productive cough. Patient had been evaluated at [facility] day prior to admission, had CT chest done: negative for PNA and was started on steroids. Patient left [facility]. She went to see Dr. Fulton due to worsening shortness of breath, was sent to the ED for further evaluation. In ED, initial vitals was stable with RR: 18, SpO2: 100% on RA. WBC: 15k, lactate 3.5. Patient received IV fluids, IV Mag and 1x dose of Levaquin. Patient was admitted for acute COPD exacerbation. She was started on IV solumedrol, Symbicort, Spiriva, nebulizer q6h, and antibiotics azithromycin and ceftriaxone. Pulmonology was consulted Symptoms have improved, now transitioned to PO prednisone. Patient currently hemodynamically stable, afebrile for discharge. Will send on taper of PO prednisone for 12 days and azithromycin 250 mg for 5 days. Patient advised to followup with PCP Dr. Fulton outpatient.       Vitals  T(F): 97.7 (12-04-22 @ 00:04), Max: 97.7 (12-04-22 @ 00:04)  HR: 89 (12-04-22 @ 08:12) (79 - 107)  BP: 141/83 (12-04-22 @ 08:11) (122/77 - 142/77)  RR: 21 (12-04-22 @ 08:11) (20 - 21)  SpO2: 100% (12-04-22 @ 08:11) (95% - 100%)    PHYSICAL EXAM:  General: Awake and alert. No acute distress.   Skin: Warm, dry, and pink.   Eyes: Pupils equal and reactive to light. Extraocular eye movements intact. No conjunctival injection, discharge, or scleral icterus.   HEENT: Atraumatic, normocephalic. Moist mucus membranes.   Cardiology: Normal S1, S2. Regular rate and rhythm.   Respiratory: +wheezes. Unlabored breathing.  Gastrointestinal: Positive bowel sounds. Soft, non-tender, non-distended.  Extremities: No peripheral edema bilaterally.  Neurological: A&Ox3. Normal speech.  Psychiatric: Normal affect. Normal mood.  Skin: No rashes or lesions    RADIOLOGY    < from: Xray Chest 1 View- PORTABLE-Urgent (12.01.22 @ 16:24) >      IMPRESSION: COPD hyperexpansion of the lungs again noted.    --- End of Report ---      < end of copied text >

## 2022-12-04 NOTE — DISCHARGE NOTE PROVIDER - NSDCCPCAREPLAN_GEN_ALL_CORE_FT
PRINCIPAL DISCHARGE DIAGNOSIS  Diagnosis: COPD exacerbation  Assessment and Plan of Treatment: You came into the hospital with shortness of breath and cough. In the hospital you were treated for an exacerbation of COPD. In the hospital you were treated with IV steroids, antibiotics and inhalers. Please continue inhalers symbicort and spiriva outpatient. We will send you home with a steroid taper of prednisone 10 mg over 12 days. Please take as written:  4 tablets once a day for 3 days  3 tablets once a day for 3 days  2 tablets one a day for 3 days  1 tablet one a day for 3 days  We will also send you with antibiotics azithromycin 250 mg once a day for 5 days.Please follow up with your primary care doctor Dr. Fulton within one week of discharge for further monitoring of this condition.

## 2022-12-06 LAB
CULTURE RESULTS: SIGNIFICANT CHANGE UP
CULTURE RESULTS: SIGNIFICANT CHANGE UP
SPECIMEN SOURCE: SIGNIFICANT CHANGE UP
SPECIMEN SOURCE: SIGNIFICANT CHANGE UP

## 2022-12-08 DIAGNOSIS — E87.20 ACIDOSIS, UNSPECIFIED: ICD-10-CM

## 2022-12-08 DIAGNOSIS — Z79.899 OTHER LONG TERM (CURRENT) DRUG THERAPY: ICD-10-CM

## 2022-12-08 DIAGNOSIS — T38.0X5A ADVERSE EFFECT OF GLUCOCORTICOIDS AND SYNTHETIC ANALOGUES, INITIAL ENCOUNTER: ICD-10-CM

## 2022-12-08 DIAGNOSIS — D72.828 OTHER ELEVATED WHITE BLOOD CELL COUNT: ICD-10-CM

## 2022-12-08 DIAGNOSIS — Z20.822 CONTACT WITH AND (SUSPECTED) EXPOSURE TO COVID-19: ICD-10-CM

## 2022-12-08 DIAGNOSIS — K50.90 CROHN'S DISEASE, UNSPECIFIED, WITHOUT COMPLICATIONS: ICD-10-CM

## 2022-12-08 DIAGNOSIS — E83.52 HYPERCALCEMIA: ICD-10-CM

## 2022-12-08 DIAGNOSIS — J44.1 CHRONIC OBSTRUCTIVE PULMONARY DISEASE WITH (ACUTE) EXACERBATION: ICD-10-CM

## 2022-12-08 DIAGNOSIS — E87.6 HYPOKALEMIA: ICD-10-CM

## 2022-12-08 DIAGNOSIS — Z88.0 ALLERGY STATUS TO PENICILLIN: ICD-10-CM

## 2022-12-08 DIAGNOSIS — Z79.82 LONG TERM (CURRENT) USE OF ASPIRIN: ICD-10-CM

## 2022-12-08 DIAGNOSIS — F17.210 NICOTINE DEPENDENCE, CIGARETTES, UNCOMPLICATED: ICD-10-CM

## 2022-12-08 DIAGNOSIS — I10 ESSENTIAL (PRIMARY) HYPERTENSION: ICD-10-CM

## 2022-12-08 DIAGNOSIS — R73.9 HYPERGLYCEMIA, UNSPECIFIED: ICD-10-CM

## 2022-12-14 ENCOUNTER — TRANSCRIPTION ENCOUNTER (OUTPATIENT)
Age: 72
End: 2022-12-14

## 2022-12-19 ENCOUNTER — TRANSCRIPTION ENCOUNTER (OUTPATIENT)
Age: 72
End: 2022-12-19

## 2022-12-20 ENCOUNTER — TRANSCRIPTION ENCOUNTER (OUTPATIENT)
Age: 72
End: 2022-12-20

## 2024-08-07 ENCOUNTER — APPOINTMENT (OUTPATIENT)
Dept: UROLOGY | Facility: CLINIC | Age: 74
End: 2024-08-07

## 2024-08-07 PROBLEM — N20.0 KIDNEY STONE ON LEFT SIDE: Status: ACTIVE | Noted: 2024-08-07

## 2024-08-07 PROBLEM — R31.29 HEMATURIA, MICROSCOPIC: Status: ACTIVE | Noted: 2024-08-07

## 2024-08-07 PROCEDURE — 99213 OFFICE O/P EST LOW 20 MIN: CPT

## 2024-08-07 PROCEDURE — 99203 OFFICE O/P NEW LOW 30 MIN: CPT

## 2024-08-07 NOTE — PHYSICAL EXAM
[Normal Appearance] : normal appearance [Well Groomed] : well groomed [General Appearance - In No Acute Distress] : no acute distress [Respiration, Rhythm And Depth] : normal respiratory rhythm and effort [Exaggerated Use Of Accessory Muscles For Inspiration] : no accessory muscle use [Abdomen Soft] : soft [Abdomen Tenderness] : non-tender [Costovertebral Angle Tenderness] : no ~M costovertebral angle tenderness [Urinary Bladder Findings] : the bladder was normal on palpation [Normal Station and Gait] : the gait and station were normal for the patient's age [] : no rash [No Focal Deficits] : no focal deficits [Oriented To Time, Place, And Person] : oriented to person, place, and time [Affect] : the affect was normal [Mood] : the mood was normal

## 2024-08-09 NOTE — ASSESSMENT
[FreeTextEntry1] : Outside Records Reviewed.   For Hematuria, RBUS showed stones as probable cause. Will obtain Cystoscopy.   For Kidney Stone, no obstruction. Will assess kidney function. BMP collected.  Patient will RTO for Cystoscopy.

## 2024-08-09 NOTE — HISTORY OF PRESENT ILLNESS
[FreeTextEntry1] : 73-year-old woman presents for follow up. Recent RBUS showed left lower pole stone and atrophic left kidney.  UA showed 8 RBC/HPF. Patient denies bothersome LUTS or other  complaints.

## 2024-08-12 ENCOUNTER — NON-APPOINTMENT (OUTPATIENT)
Age: 74
End: 2024-08-12

## 2024-08-30 ENCOUNTER — APPOINTMENT (OUTPATIENT)
Dept: UROLOGY | Facility: CLINIC | Age: 74
End: 2024-08-30
Payer: MEDICARE

## 2024-08-30 VITALS
DIASTOLIC BLOOD PRESSURE: 67 MMHG | TEMPERATURE: 97.7 F | SYSTOLIC BLOOD PRESSURE: 120 MMHG | RESPIRATION RATE: 14 BRPM | BODY MASS INDEX: 16.07 KG/M2 | HEART RATE: 94 BPM | HEIGHT: 66 IN | WEIGHT: 100 LBS | OXYGEN SATURATION: 99 %

## 2024-08-30 DIAGNOSIS — N34.3 URETHRAL SYNDROME, UNSPECIFIED: ICD-10-CM

## 2024-08-30 DIAGNOSIS — R31.29 OTHER MICROSCOPIC HEMATURIA: ICD-10-CM

## 2024-08-30 PROCEDURE — 52285 CYSTOSCOPY AND TREATMENT: CPT

## 2025-06-24 ENCOUNTER — OFFICE (OUTPATIENT)
Dept: URBAN - METROPOLITAN AREA CLINIC 105 | Facility: CLINIC | Age: 75
Setting detail: OPHTHALMOLOGY
End: 2025-06-24
Payer: MEDICARE

## 2025-06-24 DIAGNOSIS — H10.89: ICD-10-CM

## 2025-06-24 PROCEDURE — 99203 OFFICE O/P NEW LOW 30 MIN: CPT | Performed by: OPTOMETRIST

## 2025-06-24 ASSESSMENT — REFRACTION_MANIFEST
OS_SPHERE: -0.50
OS_CYLINDER: -0.75
OS_VA1: 20/20
OD_VA1: 20/25+1
OD_CYLINDER: -1.00
OD_SPHERE: PLANO
OS_AXIS: 025
OD_AXIS: 155

## 2025-06-24 ASSESSMENT — CORNEAL TRAUMA - FOREIGN BODY: OS_FOREIGNBODY: ABSENT

## 2025-06-24 ASSESSMENT — KERATOMETRY
OD_K2POWER_DIOPTERS: 43.75
OD_AXISANGLE_DEGREES: 089
OS_AXISANGLE_DEGREES: 095
OS_K1POWER_DIOPTERS: 43.50
OD_K1POWER_DIOPTERS: 43.25
OS_K2POWER_DIOPTERS: 44.75

## 2025-06-24 ASSESSMENT — VISUAL ACUITY
OS_BCVA: 20/30-1
OD_BCVA: 20/30-1

## 2025-06-24 ASSESSMENT — REFRACTION_AUTOREFRACTION
OD_AXIS: 121
OS_CYLINDER: -0.75
OD_CYLINDER: -0.50
OS_SPHERE: PLANO
OS_AXIS: 048
OD_SPHERE: PLANO

## 2025-06-24 ASSESSMENT — REFRACTION_CURRENTRX
OS_OVR_VA: 20/
OS_SPHERE: -5.25
OD_CYLINDER: -0.50
OD_SPHERE: -3.25
OD_OVR_VA: 20/
OD_AXIS: 162
OD_VPRISM_DIRECTION: SV
OS_CYLINDER: -0.25
OS_VPRISM_DIRECTION: SV
OS_AXIS: 022

## 2025-06-24 ASSESSMENT — CORNEAL DYSTROPHY - POSTERIOR
OS_POSTERIORDYSTROPHY: GUTTATA
OD_POSTERIORDYSTROPHY: GUTTATA

## 2025-06-24 ASSESSMENT — LID EXAM ASSESSMENTS: OS_COMMENTS: LID EVERTED - NO FB

## 2025-06-24 ASSESSMENT — CONFRONTATIONAL VISUAL FIELD TEST (CVF)
OD_FINDINGS: FULL
OS_FINDINGS: FULL

## 2025-06-24 ASSESSMENT — CORNEAL TRAUMA - ABRASION: OS_ABRASION: ABSENT

## 2025-06-28 ENCOUNTER — OFFICE (OUTPATIENT)
Dept: URBAN - METROPOLITAN AREA CLINIC 113 | Facility: CLINIC | Age: 75
Setting detail: OPHTHALMOLOGY
End: 2025-06-28
Payer: MEDICARE

## 2025-06-28 ENCOUNTER — RX ONLY (RX ONLY)
Age: 75
End: 2025-06-28

## 2025-06-28 DIAGNOSIS — H10.89: ICD-10-CM

## 2025-06-28 DIAGNOSIS — H02.89: ICD-10-CM

## 2025-06-28 PROCEDURE — 99213 OFFICE O/P EST LOW 20 MIN: CPT | Performed by: OPTOMETRIST

## 2025-06-28 ASSESSMENT — REFRACTION_AUTOREFRACTION
OD_CYLINDER: -0.50
OS_SPHERE: PL
OD_SPHERE: -0.25
OS_AXIS: 012
OS_CYLINDER: -0.75
OD_AXIS: 153

## 2025-06-28 ASSESSMENT — CORNEAL DYSTROPHY - POSTERIOR
OS_POSTERIORDYSTROPHY: GUTTATA
OD_POSTERIORDYSTROPHY: GUTTATA

## 2025-06-28 ASSESSMENT — KERATOMETRY
OD_K2POWER_DIOPTERS: 43.75
OS_K2POWER_DIOPTERS: 44.25
OS_AXISANGLE_DEGREES: 096
OD_AXISANGLE_DEGREES: 068
OS_K1POWER_DIOPTERS: 43.50
OD_K1POWER_DIOPTERS: 43.00

## 2025-06-28 ASSESSMENT — LID EXAM ASSESSMENTS
OD_MEIBOMITIS: RLL RUL 2+
OS_MEIBOMITIS: LLL LUL 2+

## 2025-06-28 ASSESSMENT — REFRACTION_MANIFEST
OS_CYLINDER: -0.75
OD_AXIS: 155
OS_SPHERE: -0.50
OS_AXIS: 025
OD_VA1: 20/25+1
OD_CYLINDER: -1.00
OS_VA1: 20/20
OD_SPHERE: PLANO

## 2025-06-28 ASSESSMENT — TONOMETRY
OD_IOP_MMHG: 19
OS_IOP_MMHG: 15

## 2025-06-28 ASSESSMENT — REFRACTION_CURRENTRX
OS_CYLINDER: -0.25
OS_OVR_VA: 20/
OS_SPHERE: -5.25
OD_VPRISM_DIRECTION: SV
OD_AXIS: 162
OD_SPHERE: -3.25
OS_VPRISM_DIRECTION: SV
OD_OVR_VA: 20/
OD_CYLINDER: -0.50
OS_AXIS: 022

## 2025-06-28 ASSESSMENT — VISUAL ACUITY
OS_BCVA: 20/25+1
OD_BCVA: 20/25+2

## 2025-06-28 ASSESSMENT — CONFRONTATIONAL VISUAL FIELD TEST (CVF)
OD_FINDINGS: FULL
OS_FINDINGS: FULL